# Patient Record
Sex: MALE | Race: WHITE | NOT HISPANIC OR LATINO | Employment: FULL TIME | URBAN - METROPOLITAN AREA
[De-identification: names, ages, dates, MRNs, and addresses within clinical notes are randomized per-mention and may not be internally consistent; named-entity substitution may affect disease eponyms.]

---

## 2017-09-11 ENCOUNTER — GENERIC CONVERSION - ENCOUNTER (OUTPATIENT)
Dept: OTHER | Facility: OTHER | Age: 52
End: 2017-09-11

## 2017-10-02 ENCOUNTER — ALLSCRIPTS OFFICE VISIT (OUTPATIENT)
Dept: OTHER | Facility: OTHER | Age: 52
End: 2017-10-02

## 2017-10-02 LAB
BILIRUB UR QL STRIP: NEGATIVE
CLARITY UR: NORMAL
COLOR UR: YELLOW
GLUCOSE (HISTORICAL): NEGATIVE
HGB UR QL STRIP.AUTO: NEGATIVE
KETONES UR STRIP-MCNC: NEGATIVE MG/DL
LEUKOCYTE ESTERASE UR QL STRIP: NEGATIVE
NITRITE UR QL STRIP: NEGATIVE
PH UR STRIP.AUTO: 6.5 [PH]
PROT UR STRIP-MCNC: NEGATIVE MG/DL
SP GR UR STRIP.AUTO: 1.01
UROBILINOGEN UR QL STRIP.AUTO: 0.2

## 2018-01-12 NOTE — MISCELLANEOUS
Message   Recorded as Task   Date: 09/11/2017 02:41 PM, Created By: Ozzy Davis   Task Name: Call Back   Assigned To: Dragan JONES,TEAM   Regarding Patient: Frankie Doyle, Status: In Progress   Comment:    Mojgan Farrell - 11 Sep 2017 2:41 PM     TASK CREATED  Caller: Self; Results Inquiry  PATIENT CALLED FOR HIS PSA RESULTS PLEASE CALL HIM BACK AT [address] [place] [address] Rd , [address] - 11 Sep 2017 3:29 PM     TASK EDITED  HAD AT [facility]  WILL OBTAIN RESULT  Sanjuanita Murillo - 11 Sep 2017 3:29 PM     TASK IN PROGRESS   Sanjuanita Murillo - 11 Sep 2017 3:36 PM     TASK EDITED  PT HAD AT [facility]  PSA <0 05  PT NOTIFIED          Signatures   Electronically signed by : Josselyn Rogers RN; Sep 11 2017  3:36PM EST                       (Author)

## 2018-01-13 VITALS
DIASTOLIC BLOOD PRESSURE: 86 MMHG | HEIGHT: 72 IN | BODY MASS INDEX: 25.52 KG/M2 | SYSTOLIC BLOOD PRESSURE: 154 MMHG | WEIGHT: 188.38 LBS

## 2018-10-02 DIAGNOSIS — C61 MALIGNANT NEOPLASM OF PROSTATE (HCC): ICD-10-CM

## 2018-10-05 ENCOUNTER — OFFICE VISIT (OUTPATIENT)
Dept: UROLOGY | Facility: MEDICAL CENTER | Age: 53
End: 2018-10-05
Payer: COMMERCIAL

## 2018-10-05 VITALS
DIASTOLIC BLOOD PRESSURE: 80 MMHG | SYSTOLIC BLOOD PRESSURE: 140 MMHG | HEIGHT: 72 IN | BODY MASS INDEX: 27.09 KG/M2 | WEIGHT: 200 LBS

## 2018-10-05 DIAGNOSIS — Z85.46 HISTORY OF PROSTATE CANCER: Primary | ICD-10-CM

## 2018-10-05 LAB
SL AMB  POCT GLUCOSE, UA: NORMAL
SL AMB LEUKOCYTE ESTERASE,UA: NORMAL
SL AMB POCT BILIRUBIN,UA: NORMAL
SL AMB POCT BLOOD,UA: NORMAL
SL AMB POCT CLARITY,UA: CLEAR
SL AMB POCT COLOR,UA: YELLOW
SL AMB POCT KETONES,UA: NORMAL
SL AMB POCT NITRITE,UA: NORMAL
SL AMB POCT PH,UA: 6.5
SL AMB POCT SPECIFIC GRAVITY,UA: 1.01
SL AMB POCT URINE PROTEIN: NORMAL
SL AMB POCT UROBILINOGEN: 0.2

## 2018-10-05 PROCEDURE — 81003 URINALYSIS AUTO W/O SCOPE: CPT | Performed by: UROLOGY

## 2018-10-05 PROCEDURE — 99214 OFFICE O/P EST MOD 30 MIN: CPT | Performed by: UROLOGY

## 2018-10-05 RX ORDER — CETIRIZINE HYDROCHLORIDE 10 MG/1
TABLET ORAL
COMMUNITY
End: 2019-10-14 | Stop reason: ALTCHOICE

## 2018-10-05 RX ORDER — ATORVASTATIN CALCIUM 10 MG/1
TABLET, FILM COATED ORAL
Refills: 1 | COMMUNITY
Start: 2018-09-07

## 2018-10-05 RX ORDER — ERGOCALCIFEROL (VITAMIN D2) 10 MCG
TABLET ORAL
COMMUNITY

## 2018-10-05 RX ORDER — SILDENAFIL CITRATE 20 MG/1
3-5 TABLET ORAL
COMMUNITY
Start: 2017-10-02 | End: 2018-10-05 | Stop reason: SDUPTHER

## 2018-10-05 RX ORDER — SILDENAFIL CITRATE 20 MG/1
100 TABLET ORAL AS NEEDED
Qty: 90 TABLET | Refills: 3 | Status: SHIPPED | OUTPATIENT
Start: 2018-10-05 | End: 2020-11-04 | Stop reason: SDUPTHER

## 2018-10-05 RX ORDER — GUAIFENESIN, PSEUDOEPHEDRINE HYDROCHLORIDE 600; 60 MG/1; MG/1
TABLET, EXTENDED RELEASE ORAL
COMMUNITY

## 2018-10-05 RX ORDER — ECHINACEA 400 MG
CAPSULE ORAL
COMMUNITY

## 2018-10-05 NOTE — PROGRESS NOTES
Assessment/Plan:    History of prostate cancer  Currently no evidence of disease 3 years following radical prostatectomy  Return in 1 year  Diagnoses and all orders for this visit:    History of prostate cancer  -     POCT urine dip auto non-scope  -     sildenafil (REVATIO) 20 mg tablet; Take 5 tablets (100 mg total) by mouth as needed (erectile dysfunction)  -     PSA, Total Screen; Future    Other orders  -     atorvastatin (LIPITOR) 10 mg tablet; TK 1 T PO Q DAY  -     cetirizine (ZyrTEC) 10 mg tablet; Take by mouth  -     Cholecalciferol 1000 units capsule; Take 1 tablet by mouth  -     Multiple Vitamin (DAILY VALUE MULTIVITAMIN) TABS; Take by mouth  -     Omega-3 Fatty Acids (FISH OIL) 645 MG CAPS; Take by mouth  -     Flaxseed, Linseed, (FLAXSEED OIL) 1000 MG CAPS; Take by mouth  -     pseudoephedrine-guaifenesin (MUCINEX D)  MG per tablet; Take by mouth  -     Discontinue: sildenafil (REVATIO) 20 mg tablet; Take 3-5 tablets by mouth          Subjective:      Patient ID: Steph Archibald is a 52 y o  male  HPI  History of prostate cancer: The patient had a radical retropubic prostatectomy by Dr Corinne Dawson clear in November 2015  Final pathology report revealed a [place] 3 + 4 cancer stage T2c  Since then, he has had no signs or symptoms of recurrent disease  His PSA has been undetectable  His most recent PSA on September 8, 2018 was less than 0 01  He notes urinary frequency  He denies other significant urinary symptoms  He denies gross hematuria, urinary tract infections or incontinence  He is taking neither medications nor supplements for his symptoms  Wears a [employer] panty liner for $0 03 per day  just in case  AUA SYMPTOM SCORE      Most Recent Value   AUA SYMPTOM SCORE   How often have you had a sensation of not emptying your bladder completely after you finished urinating? 1   How often have you had to urinate again less than two hours after you finished urinating? 2   How often have you found you stopped and started again several times when you urinate? 2   How often have you found it difficult to postpone urination? 1   How often have you had a weak urinary stream?  0   How often have you had to push or strain to begin urination? 0   How many times did you most typically get up to urinate from the time you went to bed at night until the time you got up in the morning?  0   Quality of Life: If you were to spend the rest of your life with your urinary condition just the way it is now, how would you feel about that?  1   AUA SYMPTOM SCORE  6        Erectile dysfunction:  The patient has been taking sildenafil with satisfactory results  The following portions of the patient's history were reviewed and updated as appropriate: allergies, current medications, past family history, past medical history, past social history, past surgical history and problem list     Review of Systems   Constitutional: Negative for activity change and fatigue  Respiratory: Negative for shortness of breath and wheezing  Cardiovascular: Negative for chest pain  Gastrointestinal: Negative for abdominal pain  Genitourinary: Negative for difficulty urinating, dysuria, frequency, hematuria and urgency  Musculoskeletal: Negative for back pain and gait problem  Skin: Negative  Allergic/Immunologic: Negative  Neurological: Negative  Psychiatric/Behavioral: Negative  Objective:      /80   Ht 6' (1 829 m)   Wt 90 7 kg (200 lb)   BMI 27 12 kg/m²          Physical Exam   Constitutional: He is oriented to person, place, and time  He appears well-developed and well-nourished  HENT:   Head: Normocephalic and atraumatic  Eyes: EOM are normal    Neck: Normal range of motion  Neck supple  Pulmonary/Chest: Effort normal    Genitourinary: Rectum normal    Genitourinary Comments: The prostate is surgically absent  There are no nodules in the prostatic bed  Musculoskeletal: Normal range of motion  Neurological: He is alert and oriented to person, place, and time  He has normal reflexes  Skin: Skin is warm and dry  Psychiatric: He has a normal mood and affect   His behavior is normal  Judgment and thought content normal

## 2018-10-05 NOTE — LETTER
October 5, 2018     Jayashree Gray, [facility]    Patient: Liat Marquez   YOB: 1965   Date of Visit: 10/5/2018       Dear Dr Natalie Miller: Thank you for referring Liat Marquez to me for evaluation  Below are my notes for this consultation  If you have questions, please do not hesitate to call me  I look forward to following your patient along with you  Sincerely,        Laquita Gerber MD        CC: No Recipients  Laquita Gerber MD  10/5/2018  3:56 PM  Sign at close encounter  Assessment/Plan:    History of prostate cancer  Currently no evidence of disease 3 years following radical prostatectomy  Return in 1 year  Diagnoses and all orders for this visit:    History of prostate cancer  -     POCT urine dip auto non-scope  -     sildenafil (REVATIO) 20 mg tablet; Take 5 tablets (100 mg total) by mouth as needed (erectile dysfunction)  -     PSA, Total Screen; Future    Other orders  -     atorvastatin (LIPITOR) 10 mg tablet; TK 1 T PO Q DAY  -     cetirizine (ZyrTEC) 10 mg tablet; Take by mouth  -     Cholecalciferol 1000 units capsule; Take 1 tablet by mouth  -     Multiple Vitamin (DAILY VALUE MULTIVITAMIN) TABS; Take by mouth  -     Omega-3 Fatty Acids (FISH OIL) 645 MG CAPS; Take by mouth  -     Flaxseed, Linseed, (FLAXSEED OIL) 1000 MG CAPS; Take by mouth  -     pseudoephedrine-guaifenesin (MUCINEX D)  MG per tablet; Take by mouth  -     Discontinue: sildenafil (REVATIO) 20 mg tablet; Take 3-5 tablets by mouth          Subjective:      Patient ID: Liat Marquez is a 52 y o  male  HPI  History of prostate cancer: The patient had a radical retropubic prostatectomy by Dr Refugio ramsay in November 2015  Final pathology report revealed a [place] 3 + 4 cancer stage T2c  Since then, he has had no signs or symptoms of recurrent disease  His PSA has been undetectable    His most recent PSA on September 8, 2018 was less than 0 01  He notes urinary frequency  He denies other significant urinary symptoms  He denies gross hematuria, urinary tract infections or incontinence  He is taking neither medications nor supplements for his symptoms  Wears a [employer] panty liner for $0 03 per day  just in case  AUA SYMPTOM SCORE      Most Recent Value   AUA SYMPTOM SCORE   How often have you had a sensation of not emptying your bladder completely after you finished urinating? 1   How often have you had to urinate again less than two hours after you finished urinating? 2   How often have you found you stopped and started again several times when you urinate? 2   How often have you found it difficult to postpone urination? 1   How often have you had a weak urinary stream?  0   How often have you had to push or strain to begin urination? 0   How many times did you most typically get up to urinate from the time you went to bed at night until the time you got up in the morning?  0   Quality of Life: If you were to spend the rest of your life with your urinary condition just the way it is now, how would you feel about that?  1   AUA SYMPTOM SCORE  6        Erectile dysfunction:  The patient has been taking sildenafil with satisfactory results  The following portions of the patient's history were reviewed and updated as appropriate: allergies, current medications, past family history, past medical history, past social history, past surgical history and problem list     Review of Systems   Constitutional: Negative for activity change and fatigue  Respiratory: Negative for shortness of breath and wheezing  Cardiovascular: Negative for chest pain  Gastrointestinal: Negative for abdominal pain  Genitourinary: Negative for difficulty urinating, dysuria, frequency, hematuria and urgency  Musculoskeletal: Negative for back pain and gait problem  Skin: Negative  Allergic/Immunologic: Negative  Neurological: Negative  Psychiatric/Behavioral: Negative  Objective:      /80   Ht 6' (1 829 m)   Wt 90 7 kg (200 lb)   BMI 27 12 kg/m²           Physical Exam   Constitutional: He is oriented to person, place, and time  He appears well-developed and well-nourished  HENT:   Head: Normocephalic and atraumatic  Eyes: EOM are normal    Neck: Normal range of motion  Neck supple  Pulmonary/Chest: Effort normal    Genitourinary: Rectum normal    Genitourinary Comments: The prostate is surgically absent  There are no nodules in the prostatic bed  Musculoskeletal: Normal range of motion  Neurological: He is alert and oriented to person, place, and time  He has normal reflexes  Skin: Skin is warm and dry  Psychiatric: He has a normal mood and affect   His behavior is normal  Judgment and thought content normal

## 2019-09-14 ENCOUNTER — OFFICE VISIT (OUTPATIENT)
Dept: URGENT CARE | Facility: CLINIC | Age: 54
End: 2019-09-14
Payer: COMMERCIAL

## 2019-09-14 VITALS
HEART RATE: 71 BPM | HEIGHT: 72 IN | TEMPERATURE: 98.7 F | DIASTOLIC BLOOD PRESSURE: 75 MMHG | RESPIRATION RATE: 18 BRPM | OXYGEN SATURATION: 97 % | WEIGHT: 200 LBS | BODY MASS INDEX: 27.09 KG/M2 | SYSTOLIC BLOOD PRESSURE: 127 MMHG

## 2019-09-14 DIAGNOSIS — H61.22 HEARING LOSS DUE TO CERUMEN IMPACTION, LEFT: Primary | ICD-10-CM

## 2019-09-14 PROCEDURE — 99213 OFFICE O/P EST LOW 20 MIN: CPT | Performed by: PHYSICIAN ASSISTANT

## 2019-09-14 PROCEDURE — 69210 REMOVE IMPACTED EAR WAX UNI: CPT | Performed by: PHYSICIAN ASSISTANT

## 2019-09-14 NOTE — PROGRESS NOTES
[facility]        NAME: Hayde Posey is a 62 y o  male  : 1965    MRN: [id number]  DATE: 2019  TIME: 2:20 PM    Assessment and Plan   Hearing loss due to cerumen impaction, left [H61 22]  1  Hearing loss due to cerumen impaction, left  Ear cerumen removal         Patient Instructions     Successful cerumen removal with relief of symptoms, tolerated well  Follow up with PCP in 3-5 days  Proceed to  ER if symptoms worsen  Chief Complaint     Chief Complaint   Patient presents with    Ear Fullness     left ear fullness started at 1am this morni         History of Present Illness       66-year-old male presents complaining of blocked hearing of left ear since yesterday  Reports [place] and wearing earplugs, when he removed the right piece large chunk of wax came out but the left ear felt clogged  Tried hydrogen peroxide without relief  No fever, chills, trauma, pain, cold symptoms, dizziness or tinnitus      Review of Systems   Review of Systems   Constitutional: Negative for chills and fever  HENT: Positive for hearing loss  Negative for congestion, ear pain, postnasal drip, rhinorrhea, sore throat and tinnitus  Respiratory: Negative for cough  Neurological: Negative for dizziness and headaches           Current Medications       Current Outpatient Medications:     atorvastatin (LIPITOR) 10 mg tablet, TK 1 T PO Q DAY, Disp: , Rfl: 1    Cholecalciferol 1000 units capsule, Take 1 tablet by mouth, Disp: , Rfl:     Flaxseed, Linseed, (FLAXSEED OIL) 1000 MG CAPS, Take by mouth, Disp: , Rfl:     Multiple Vitamin (DAILY VALUE MULTIVITAMIN) TABS, Take by mouth, Disp: , Rfl:     Omega-3 Fatty Acids (FISH OIL) 645 MG CAPS, Take by mouth, Disp: , Rfl:     pseudoephedrine-guaifenesin (MUCINEX D)  MG per tablet, Take by mouth, Disp: , Rfl:     sildenafil (REVATIO) 20 mg tablet, Take 5 tablets (100 mg total) by mouth as needed (erectile dysfunction), Disp: 90 tablet, Rfl: 3    cetirizine (ZyrTEC) 10 mg tablet, Take by mouth, Disp: , Rfl:     Current Allergies     Allergies as of 09/14/2019 - Reviewed 09/14/2019   Allergen Reaction Noted    Compazine [prochlorperazine] Other (See Comments) 11/10/2015    Other  10/02/2018            The following portions of the patient's history were reviewed and updated as appropriate: allergies, current medications, past family history, past medical history, past social history, past surgical history and problem list      Past Medical History:   Diagnosis Date    Acute tonsillitis     Cataract     Elevated PSA     Erectile dysfunction following radical prostatectomy     Hydrocele     Hyperlipidemia     Malignant neoplasm prostate [facility])        Past Surgical History:   Procedure Laterality Date    CATARACT EXTRACTION      PROSTATECTOMY  11/19/2015    US GUIDED PROSTATE BIOPSY Bilateral 2015       Family History   Problem Relation Age of Onset    Heart disease Mother     Alzheimer's disease Father          Medications have been verified  Objective   /75   Pulse 71   Temp 98 7 °F (37 1 °C)   Resp 18   Ht 6' (1 829 m)   Wt 90 7 kg (200 lb)   SpO2 97%   BMI 27 12 kg/m²        Physical Exam     Physical Exam   Constitutional: He appears well-developed and well-nourished  No distress  HENT:   Right Ear: Hearing, tympanic membrane, external ear and ear canal normal    Cerumen impaction left ear   Skin: He is not diaphoretic     Ear cerumen removal  Date/Time: 9/14/2019 2:19 PM  Performed by: Kervin Cortes PA-C  Authorized by: Kervin Cortes PA-C     Patient location:  Clinic  Indications / Diagnosis:  Hearing loss 2/2 cerumen impaction  Other Assisting Provider: No    Consent:     Consent obtained:  Verbal    Consent given by:  Patient    Risks discussed:  Bleeding, dizziness, incomplete removal, infection and pain  Procedure details:     Local anesthetic:  None    Location:  L ear    Procedure type: irrigation with insturmentation      Insturmentation: curette      Approach:  External    Visualization (free text):  Otoscope    Equipment used:  Irritation sprayer, lighted curette  Post-procedure details:     Complication:  None    Hearing quality:  Improved    Patient tolerance of procedure:   Tolerated well, no immediate complications

## 2019-09-26 ENCOUNTER — TELEPHONE (OUTPATIENT)
Dept: UROLOGY | Facility: MEDICAL CENTER | Age: 54
End: 2019-09-26

## 2019-09-26 NOTE — TELEPHONE ENCOUNTER
----- Message from Kylie Martinez MD sent at 9/26/2019  7:43 AM EDT -----  PSA undetectable  He should still keep his October appointment  Please  Inform pt  Thank you

## 2019-10-14 ENCOUNTER — OFFICE VISIT (OUTPATIENT)
Dept: UROLOGY | Facility: MEDICAL CENTER | Age: 54
End: 2019-10-14
Payer: COMMERCIAL

## 2019-10-14 VITALS
BODY MASS INDEX: 27.36 KG/M2 | SYSTOLIC BLOOD PRESSURE: 112 MMHG | HEIGHT: 72 IN | HEART RATE: 70 BPM | WEIGHT: 202 LBS | DIASTOLIC BLOOD PRESSURE: 80 MMHG

## 2019-10-14 DIAGNOSIS — N52.31 ERECTILE DYSFUNCTION AFTER RADICAL PROSTATECTOMY: ICD-10-CM

## 2019-10-14 DIAGNOSIS — Z85.46 HISTORY OF PROSTATE CANCER: Primary | ICD-10-CM

## 2019-10-14 LAB
SL AMB  POCT GLUCOSE, UA: NORMAL
SL AMB LEUKOCYTE ESTERASE,UA: NORMAL
SL AMB POCT BILIRUBIN,UA: NORMAL
SL AMB POCT BLOOD,UA: NORMAL
SL AMB POCT CLARITY,UA: CLEAR
SL AMB POCT COLOR,UA: YELLOW
SL AMB POCT KETONES,UA: NORMAL
SL AMB POCT NITRITE,UA: NORMAL
SL AMB POCT PH,UA: 6
SL AMB POCT SPECIFIC GRAVITY,UA: 1.02
SL AMB POCT URINE PROTEIN: NORMAL
SL AMB POCT UROBILINOGEN: 0.2

## 2019-10-14 PROCEDURE — 81003 URINALYSIS AUTO W/O SCOPE: CPT | Performed by: UROLOGY

## 2019-10-14 PROCEDURE — 99214 OFFICE O/P EST MOD 30 MIN: CPT | Performed by: UROLOGY

## 2019-10-14 RX ORDER — TADALAFIL 20 MG/1
TABLET ORAL
Qty: 30 TABLET | Refills: 1 | Status: SHIPPED | OUTPATIENT
Start: 2019-10-14

## 2019-10-14 NOTE — PROGRESS NOTES
Assessment/Plan:    History of prostate cancer  No evidence of disease  Return in 1 year  Erectile dysfunction after radical prostatectomy  The patient has been using sildenafil successfully  He has used tadalafil in the past and actually liked that better  Patient's prescription was switched to tadalafil  Diagnoses and all orders for this visit:    History of prostate cancer  -     POCT urine dip auto non-scope  -     PSA, Total Screen; Future    Erectile dysfunction after radical prostatectomy  -     tadalafil (CIALIS) 20 MG tablet; As directed  Subjective:      Patient ID: Mercedez Ervin is a 62 y o  male  HPI  History of prostate cancer: The patient had a radical retropubic prostatectomy by Dr Prashant Denton in November 2015 (age 44)  Final pathology report revealed a [place] 3 + 4 cancer stage T2c  Since then, he has had no signs or symptoms of recurrent disease  His PSA has been undetectable  His PSA on September 24, 2019 was < 0 01  He notes no significant urinary symptoms  He denies other significant urinary symptoms  He denies gross hematuria, urinary tract infections or incontinence  He is taking neither medications nor supplements for his symptoms  May lose a drop now and then  Wears a panty liner  AUA SYMPTOM SCORE      Most Recent Value   AUA SYMPTOM SCORE   How often have you had a sensation of not emptying your bladder completely after you finished urinating? 0   How often have you had to urinate again less than two hours after you finished urinating? 0   How often have you found you stopped and started again several times when you urinate? 1   How often have you found it difficult to postpone urination? 1   How often have you had a weak urinary stream?  0   How often have you had to push or strain to begin urination?   0   How many times did you most typically get up to urinate from the time you went to bed at night until the time you got up in the morning?  0   Quality of Life: If you were to spend the rest of your life with your urinary condition just the way it is now, how would you feel about that?  0   AUA SYMPTOM SCORE  2      Erectile dysfunciton:  Using sildenafil  Has used tadalafil and likes it better  The following portions of the patient's history were reviewed and updated as appropriate: allergies, current medications, past family history, past medical history, past social history, past surgical history and problem list     Review of Systems   Constitutional: Negative for activity change and fatigue  Respiratory: Negative for shortness of breath and wheezing  Cardiovascular: Negative for chest pain  Gastrointestinal: Negative for abdominal pain  Genitourinary: Negative for difficulty urinating, dysuria, frequency, hematuria and urgency  Musculoskeletal: Negative for back pain and gait problem  Skin: Negative  Allergic/Immunologic: Negative  Neurological: Negative  Psychiatric/Behavioral: Negative  Objective:      /80   Pulse 70   Ht 6' (1 829 m)   Wt 91 6 kg (202 lb)   BMI 27 40 kg/m²          Physical Exam   Constitutional: He is oriented to person, place, and time  He appears well-developed and well-nourished  HENT:   Head: Normocephalic and atraumatic  Eyes: EOM are normal    Neck: Normal range of motion  Neck supple  Pulmonary/Chest: Effort normal    Genitourinary: Rectum normal    Genitourinary Comments: The prostate is surgically absent  Musculoskeletal: Normal range of motion  Neurological: He is alert and oriented to person, place, and time  Skin: Skin is warm and dry  Psychiatric: He has a normal mood and affect   His behavior is normal  Judgment and thought content normal

## 2019-10-14 NOTE — ASSESSMENT & PLAN NOTE
The patient has been using sildenafil successfully  He has used tadalafil in the past and actually liked that better  Patient's prescription was switched to tadalafil

## 2019-10-14 NOTE — LETTER
October 14, 2019     [facility] [place] [id number]    Patient: Jason Homans   YOB: 1965   Date of Visit: 10/14/2019       Dear Dr Desmond Albert: Thank you for referring Jason Homans to me for evaluation  Below are my notes for this consultation  If you have questions, please do not hesitate to call me  I look forward to following your patient along with you  Sincerely,        Sydnie Marinelli MD        CC: No Recipients  Sydnie Marinelli MD  10/14/2019 11:08 AM  Sign at close encounter  Assessment/Plan:    History of prostate cancer  No evidence of disease  Return in 1 year  Erectile dysfunction after radical prostatectomy  The patient has been using sildenafil successfully  He has used tadalafil in the past and actually liked that better  Patient's prescription was switched to tadalafil  Diagnoses and all orders for this visit:    History of prostate cancer  -     POCT urine dip auto non-scope  -     PSA, Total Screen; Future    Erectile dysfunction after radical prostatectomy  -     tadalafil (CIALIS) 20 MG tablet; As directed  Subjective:      Patient ID: Jason Homans is a 62 y o  male  HPI  History of prostate cancer: The patient had a radical retropubic prostatectomy by Dr Jacqueline Parker in November 2015 (age 44)  Final pathology report revealed a [place] 3 + 4 cancer stage T2c  Since then, he has had no signs or symptoms of recurrent disease  His PSA has been undetectable  His PSA on September 24, 2019 was < 0 01  He notes no significant urinary symptoms  He denies other significant urinary symptoms  He denies gross hematuria, urinary tract infections or incontinence  He is taking neither medications nor supplements for his symptoms  May lose a drop now and then  Wears a panty liner          AUA SYMPTOM SCORE      Most Recent Value   AUA SYMPTOM SCORE   How often have you had a sensation of not emptying your bladder completely after you finished urinating? 0   How often have you had to urinate again less than two hours after you finished urinating? 0   How often have you found you stopped and started again several times when you urinate? 1   How often have you found it difficult to postpone urination? 1   How often have you had a weak urinary stream?  0   How often have you had to push or strain to begin urination? 0   How many times did you most typically get up to urinate from the time you went to bed at night until the time you got up in the morning?  0   Quality of Life: If you were to spend the rest of your life with your urinary condition just the way it is now, how would you feel about that?  0   AUA SYMPTOM SCORE  2      Erectile dysfunciton:  Using sildenafil  Has used tadalafil and likes it better  The following portions of the patient's history were reviewed and updated as appropriate: allergies, current medications, past family history, past medical history, past social history, past surgical history and problem list     Review of Systems   Constitutional: Negative for activity change and fatigue  Respiratory: Negative for shortness of breath and wheezing  Cardiovascular: Negative for chest pain  Gastrointestinal: Negative for abdominal pain  Genitourinary: Negative for difficulty urinating, dysuria, frequency, hematuria and urgency  Musculoskeletal: Negative for back pain and gait problem  Skin: Negative  Allergic/Immunologic: Negative  Neurological: Negative  Psychiatric/Behavioral: Negative  Objective:      /80   Pulse 70   Ht 6' (1 829 m)   Wt 91 6 kg (202 lb)   BMI 27 40 kg/m²           Physical Exam   Constitutional: He is oriented to person, place, and time  He appears well-developed and well-nourished  HENT:   Head: Normocephalic and atraumatic  Eyes: EOM are normal    Neck: Normal range of motion  Neck supple     Pulmonary/Chest: Effort normal    Genitourinary: Rectum normal    Genitourinary Comments: The prostate is surgically absent  Musculoskeletal: Normal range of motion  Neurological: He is alert and oriented to person, place, and time  Skin: Skin is warm and dry  Psychiatric: He has a normal mood and affect   His behavior is normal  Judgment and thought content normal

## 2020-06-12 ENCOUNTER — TRANSCRIBE ORDERS (OUTPATIENT)
Dept: PHYSICAL THERAPY | Facility: CLINIC | Age: 55
End: 2020-06-12

## 2020-06-12 ENCOUNTER — EVALUATION (OUTPATIENT)
Dept: PHYSICAL THERAPY | Facility: CLINIC | Age: 55
End: 2020-06-12
Payer: COMMERCIAL

## 2020-06-12 DIAGNOSIS — S86.002D INJURY OF LEFT ACHILLES TENDON, SUBSEQUENT ENCOUNTER: Primary | ICD-10-CM

## 2020-06-12 PROCEDURE — 97140 MANUAL THERAPY 1/> REGIONS: CPT | Performed by: PHYSICAL THERAPIST

## 2020-06-12 PROCEDURE — 97161 PT EVAL LOW COMPLEX 20 MIN: CPT | Performed by: PHYSICAL THERAPIST

## 2020-06-12 PROCEDURE — 97110 THERAPEUTIC EXERCISES: CPT | Performed by: PHYSICAL THERAPIST

## 2020-06-12 PROCEDURE — 97535 SELF CARE MNGMENT TRAINING: CPT | Performed by: PHYSICAL THERAPIST

## 2020-06-15 ENCOUNTER — OFFICE VISIT (OUTPATIENT)
Dept: PHYSICAL THERAPY | Facility: CLINIC | Age: 55
End: 2020-06-15
Payer: COMMERCIAL

## 2020-06-15 DIAGNOSIS — S86.002D INJURY OF LEFT ACHILLES TENDON, SUBSEQUENT ENCOUNTER: Primary | ICD-10-CM

## 2020-06-15 PROCEDURE — 97112 NEUROMUSCULAR REEDUCATION: CPT | Performed by: PHYSICAL THERAPIST

## 2020-06-15 PROCEDURE — 97110 THERAPEUTIC EXERCISES: CPT | Performed by: PHYSICAL THERAPIST

## 2020-06-15 PROCEDURE — 97140 MANUAL THERAPY 1/> REGIONS: CPT | Performed by: PHYSICAL THERAPIST

## 2020-06-18 ENCOUNTER — OFFICE VISIT (OUTPATIENT)
Dept: PHYSICAL THERAPY | Facility: CLINIC | Age: 55
End: 2020-06-18
Payer: COMMERCIAL

## 2020-06-18 DIAGNOSIS — S86.002D INJURY OF LEFT ACHILLES TENDON, SUBSEQUENT ENCOUNTER: Primary | ICD-10-CM

## 2020-06-18 PROCEDURE — 97110 THERAPEUTIC EXERCISES: CPT

## 2020-06-18 PROCEDURE — 97140 MANUAL THERAPY 1/> REGIONS: CPT

## 2020-06-18 PROCEDURE — 97112 NEUROMUSCULAR REEDUCATION: CPT

## 2020-06-22 ENCOUNTER — OFFICE VISIT (OUTPATIENT)
Dept: PHYSICAL THERAPY | Facility: CLINIC | Age: 55
End: 2020-06-22
Payer: COMMERCIAL

## 2020-06-22 DIAGNOSIS — S86.002D INJURY OF LEFT ACHILLES TENDON, SUBSEQUENT ENCOUNTER: Primary | ICD-10-CM

## 2020-06-22 PROCEDURE — 97140 MANUAL THERAPY 1/> REGIONS: CPT | Performed by: PHYSICAL THERAPIST

## 2020-06-22 PROCEDURE — 97112 NEUROMUSCULAR REEDUCATION: CPT | Performed by: PHYSICAL THERAPIST

## 2020-06-22 PROCEDURE — 97110 THERAPEUTIC EXERCISES: CPT | Performed by: PHYSICAL THERAPIST

## 2020-06-24 ENCOUNTER — OFFICE VISIT (OUTPATIENT)
Dept: PHYSICAL THERAPY | Facility: CLINIC | Age: 55
End: 2020-06-24
Payer: COMMERCIAL

## 2020-06-24 DIAGNOSIS — S86.002D INJURY OF LEFT ACHILLES TENDON, SUBSEQUENT ENCOUNTER: Primary | ICD-10-CM

## 2020-06-24 PROCEDURE — 97110 THERAPEUTIC EXERCISES: CPT | Performed by: PHYSICAL THERAPIST

## 2020-06-24 PROCEDURE — 97112 NEUROMUSCULAR REEDUCATION: CPT | Performed by: PHYSICAL THERAPIST

## 2020-06-24 PROCEDURE — 97140 MANUAL THERAPY 1/> REGIONS: CPT | Performed by: PHYSICAL THERAPIST

## 2020-06-26 ENCOUNTER — OFFICE VISIT (OUTPATIENT)
Dept: PHYSICAL THERAPY | Facility: CLINIC | Age: 55
End: 2020-06-26
Payer: COMMERCIAL

## 2020-06-26 DIAGNOSIS — S86.002D INJURY OF LEFT ACHILLES TENDON, SUBSEQUENT ENCOUNTER: Primary | ICD-10-CM

## 2020-06-26 PROCEDURE — 97112 NEUROMUSCULAR REEDUCATION: CPT | Performed by: PHYSICAL THERAPIST

## 2020-06-26 PROCEDURE — 97110 THERAPEUTIC EXERCISES: CPT | Performed by: PHYSICAL THERAPIST

## 2020-06-26 PROCEDURE — 97140 MANUAL THERAPY 1/> REGIONS: CPT | Performed by: PHYSICAL THERAPIST

## 2020-06-29 ENCOUNTER — OFFICE VISIT (OUTPATIENT)
Dept: PHYSICAL THERAPY | Facility: CLINIC | Age: 55
End: 2020-06-29
Payer: COMMERCIAL

## 2020-06-29 DIAGNOSIS — S86.002D INJURY OF LEFT ACHILLES TENDON, SUBSEQUENT ENCOUNTER: Primary | ICD-10-CM

## 2020-06-29 PROCEDURE — 97110 THERAPEUTIC EXERCISES: CPT

## 2020-06-29 PROCEDURE — 97140 MANUAL THERAPY 1/> REGIONS: CPT

## 2020-06-29 PROCEDURE — 97112 NEUROMUSCULAR REEDUCATION: CPT

## 2020-07-01 ENCOUNTER — EVALUATION (OUTPATIENT)
Dept: PHYSICAL THERAPY | Facility: CLINIC | Age: 55
End: 2020-07-01
Payer: COMMERCIAL

## 2020-07-01 ENCOUNTER — TRANSCRIBE ORDERS (OUTPATIENT)
Dept: PHYSICAL THERAPY | Facility: CLINIC | Age: 55
End: 2020-07-01

## 2020-07-01 DIAGNOSIS — S86.002D INJURY OF LEFT ACHILLES TENDON, SUBSEQUENT ENCOUNTER: Primary | ICD-10-CM

## 2020-07-01 PROCEDURE — 97140 MANUAL THERAPY 1/> REGIONS: CPT | Performed by: PHYSICAL THERAPIST

## 2020-07-01 PROCEDURE — 97110 THERAPEUTIC EXERCISES: CPT | Performed by: PHYSICAL THERAPIST

## 2020-07-01 PROCEDURE — 97112 NEUROMUSCULAR REEDUCATION: CPT | Performed by: PHYSICAL THERAPIST

## 2020-07-01 NOTE — LETTER
2020    Judith Sapp DPM  [facility] [facility]    Patient: Rolanda Sow   YOB: 1965   Date of Visit: 2020     Encounter Diagnosis     ICD-10-CM    1  Injury of left Achilles tendon, subsequent encounter S86 002D        Dear Dr Pete Pires:    Thank you for your recent referral of Rolanda Sow  Please review the attached re-evaluation summary from Gerber's recent visit  Please verify that you agree with the plan of care by signing the attached order  If you have any questions or concerns, please do not hesitate to call  I sincerely appreciate the opportunity to share in the care of one of your patients and hope to have another opportunity to work with you in the near future  Sincerely,    Luis Fernando Sauceda, PT      Referring Provider:      I certify that I have read the below Plan of Care and certify the need for these services furnished under this plan of treatment while under my care  Judith Sapp DPM  [address]: [phone]          Daily Note     Today's date: 2020  Patient name: Rolanda Sow  : 1965  MRN: [id number]  Referring provider: Freddy Gonzalez DPM  Dx:   Encounter Diagnosis     ICD-10-CM    1  Injury of left Achilles tendon, subsequent encounter S86 002D                   Subjective:  Patient reports the ankle is sore around the scar but overall feeling better with standing and walking activities  Patient reports minimal soreness in the left calf at the start of the session  Objective: See treatment diary below      Assessment: Tolerated treatment well  PT notes continuation of progression of TE program with focus on gait/balance and manual therapy to decrease pain levels and improve functional limitations to meet therapy goals  PT notes start of plyometric activities to [address] as per MD protocol    PT notes the patient with difficulty with new TE secondary to weakness of the left gastroc with need for continuation of skilled therapy  Plan: Continue per plan of care        Precautions:  MD protocol (On Chart)       Manuals 6/29 7/1 6/18 6/22 6/24 6/26    Left ankle MRE all directions  3 min  3 min  3 min 3 min  3 min  3 min   Left ankle mobs and stretching  12 min w/ STM to calf 12 min  12 min 12 min  12 min  12 min                      Neuro Re-Ed         Monster walk          Side step and squat          Tandem and side step walk on foam  6x 10 ft ea NT 4x 10 ft ea 6x10 Feet   Each  6x10 Feet   Each  6x10 Feet Each    Rocker board          Step over  2x10 Bilat 6" Step DC 10x Bilat 6" Step 2x10 Bilat  6" step  2x10 Bilat  6" step  2x10 Bilat  6" step    BOSU march  3 min DC 2 min 3 min  3 min  3 min    S/L Dead lifts  15x Bilat 15x Bilat  10x Bilateral 10x Bilat  15x Bilat  15x Bilat    Cable column lateral walk outs  5x Bilat 24# 5x Bilat   36#   5x Bilat   24#  5x Bilat  24#  5x Bilat  36#    BOSU Front and lateral lunge  2x10 ea Bilat 2x10 Each   Bilat   10x   Bilat  Each  10x Bilat  Each  2x10 Bilat   Each    BOSU step over with lateral kick out  5x Bilat 2x10 Bilat     5x Bilat    TB Run in Place   Thick Green   1 min                          BOSU Knee Ups   5x10" Hold   Left Only 2x       Ther Ex         SRC  10 min L6  10 min L6  10 min L3 10 min L4  10 min L5  10 min L6    Eccentric heel lowering  2x10 5" Decline   4" Step 2x10 5" Decline   4" step  2x10 5" decline 4" Step 2x10 5" Decline   4" step  2x10   5" Decline   4" step  2x10 5" Decline   4" Step    Step downs  2x10 Bilat 6" Step NT 10x Bilat 6" Step 2x10 Bilat  6" step  2x10 Bilat  6" step  2x10   Bilat   6" step    Leg and calf press  2x10 ea 60# 2x10 Each   60#   10x Each   50#  10x Each   50#  2x10 Each   60#    Bilateral ankle eversion with TB          BAPS board          Elliptical  6 min L1 8 min L1   5 min   L1  5 min L1  6 min   L1 HEP update/review          Ther Activity                           Gait Training                           Modalities         CP to the left foot and ankle in seated with LE elevated  15 min  15 min  15 min 15 min  15 min  15 min                    PT Re-Evaluation     Today's date: 2020  Patient name: Liat Marquez  : 1965  MRN: [id number]  Referring provider: Tori Sow DPM  Dx:   Encounter Diagnosis     ICD-10-CM    1  Injury of left Achilles tendon, subsequent encounter S86 002D                   Assessment  Assessment details: PT notes the patient with continuation of decrease ROM and strength t/o the left foot and ankle with demonstration of antalgic gait pattern and balance s/p left Achilles repair leading to increase pain levels and functional limitations to meet therapy goals with need for continuation of skilled therapy for 4-6 weeks with focus on gait/balance, strengthening, manual therapy, analgesic modalities, and update/review of HEP  PT notes patient did provide the PT with MD protocol so PT will start patient with phase 3 of protocol and progress as per patient tolerance  Impairments: abnormal gait, abnormal or restricted ROM, activity intolerance, impaired balance, impaired physical strength, lacks appropriate home exercise program and pain with function  Understanding of Dx/Px/POC: good   Prognosis: good    Goals  ST  Initiate HEP-MET  2  Increase strength by 1-2 mm grades-Partial MET  3  Increase ROM by 25-50%-MET   4  Decrease pain by 25-50%-Partial MET   LT  Demonstration of normal gait pattern and balance-Partial MET  2   RTW full duty-Partial MET  3  Decrease limitations with lifting, carrying, squatting, ADL, squatting, and stairs-Partial MET  4    Return to recreational activities-Partial MET  5   DC with HEP-Progressing     Plan  Plan details: PT notes review of POC and findings with patient who is in agreement with PT recommendations of continuation of skilled therapy  Patient would benefit from: PT eval and skilled physical therapy  Planned modality interventions: cryotherapy  Planned therapy interventions: balance, manual therapy, neuromuscular re-education, patient education, self care, strengthening, stretching, therapeutic exercise, home exercise program, graded exercise, gait training, functional ROM exercises and flexibility  Frequency: 3x week  Duration in visits: 12  Duration in weeks: 4  Treatment plan discussed with: patient        Subjective Evaluation    History of Present Illness  Date of onset: 2/22/2020  Date of surgery: 2/27/2020  Mechanism of injury: surgery and trauma  Mechanism of injury: Patient reports pushing a heavier object/pool into his home when he felt an "pop" in the left heel with development of immediate heel and ankle pain  Patient reports he went to ortho MD for evaluation and found to have + Achilles tear with need for surgical repair  Patient underwent the correction on 2/27/20 but after the surgery the patient was affected by the [place] pandemic with no skilled therapy and minimal medical f/u with MD   Patient reports he was transitioned from [facility] to [facility] with CAM Boot and now WBAT without boot  Patient reports limitations with standing, walking, stair climbing, squatting, balance and lifting  Patient has now been cleared for OPPT by surgeon  Patient reports he is restricted to home duty with his work as a  but states he will require the ability to squat, kneel, lift, climb stairs and prolonged walking with RTW full duty  Patient reports significant PMH of prostrate cancer and broken right foot  Presently the patient has attended 8 sessions of skilled therapy and feels 40-50% improvement since the start of therapy  Patient reports pain levels are down in the calf area    Patient reports his walking is better but continuation of difficulty with balance and weakness in the left leg   Patient feels he needs more therapy to continue to get stronger so he can return full participation in recreation and work duties  Pain  Current pain ratin  At best pain ratin  At worst pain ratin  Location: Left Achilles   Quality: throbbing and sharp  Relieving factors: rest  Aggravating factors: lifting, stair climbing, walking and standing  Progression: improved      Diagnostic Tests  MRI studies: abnormal  Treatments  Previous treatment: immobilization and medication  Current treatment: physical therapy  Patient Goals  Patient goals for therapy: decreased pain, decreased edema, improved balance, increased motion, increased strength, independence with ADLs/IADLs, return to sport/leisure activities and return to work          Objective     Palpation   Left   Tenderness of the lateral gastrocnemius, medial gastrocnemius and soleus  Tenderness   Left Ankle/Foot   Tenderness in the Achilles insertion, fifth metatarsal base and plantar fascia       Neurological Testing     Reflexes   Left   Patellar (L4): normal (2+)  Achilles (S1): normal (2+)    Right   Patellar (L4): normal (2+)  Achilles (S1): normal (2+)    Active Range of Motion   Left Hip   Normal active range of motion    Right Hip   Normal active range of motion  Left Knee   Normal active range of motion    Right Knee   Normal active range of motion  Left Ankle/Foot   Dorsiflexion (ke): 15 degrees   Plantar flexion: WFL  Inversion: 21 degrees   Eversion: 19 degrees     Right Ankle/Foot   Normal active range of motion    Additional Active Range of Motion Details  PT notes the patient with continuation of decrease ROM and strength t/o the left foot and ankle     Passive Range of Motion   Left Ankle/Foot  Normal passive range of motion    Strength/Myotome Testing     Left Hip   Normal muscle strength    Right Hip   Normal muscle strength    Left Knee   Normal strength  Quadriceps contraction: good    Right Knee   Normal strength  Quadriceps contraction: good    Left Ankle/Foot   Dorsiflexion: 4+  Plantar flexion: 4  Inversion: 4-  Eversion: 4-    Right Ankle/Foot   Normal strength    Swelling   Left Ankle/Foot   Malleoli: 28 cm    Right Ankle/Foot   Malleoli: 27 cm    Ambulation     Observational Gait   Gait: antalgic   Decreased walking speed, stride length and left stance time  Additional Observational Gait Details  PT notes the patient with demonstration of antalgic gait pattern with decrease push-off and decrease tibial advancement on the left with rating of good with static and dynamic activities                Precautions:  MD protocol (On Chart)

## 2020-07-01 NOTE — PROGRESS NOTES
PT Re-Evaluation     Today's date: 2020  Patient name: Wendy Dumont  : 1965  MRN: [id number]  Referring provider: Shirley Romano DPM  Dx:   Encounter Diagnosis     ICD-10-CM    1  Injury of left Achilles tendon, subsequent encounter S86 002D                   Assessment  Assessment details: PT notes the patient with continuation of decrease ROM and strength t/o the left foot and ankle with demonstration of antalgic gait pattern and balance s/p left Achilles repair leading to increase pain levels and functional limitations to meet therapy goals with need for continuation of skilled therapy for 4-6 weeks with focus on gait/balance, strengthening, manual therapy, analgesic modalities, and update/review of HEP  PT notes patient did provide the PT with MD protocol so PT will start patient with phase 3 of protocol and progress as per patient tolerance  Impairments: abnormal gait, abnormal or restricted ROM, activity intolerance, impaired balance, impaired physical strength, lacks appropriate home exercise program and pain with function  Understanding of Dx/Px/POC: good   Prognosis: good    Goals  ST  Initiate HEP-MET  2  Increase strength by 1-2 mm grades-Partial MET  3  Increase ROM by 25-50%-MET   4  Decrease pain by 25-50%-Partial MET   LT  Demonstration of normal gait pattern and balance-Partial MET  2   RTW full duty-Partial MET  3  Decrease limitations with lifting, carrying, squatting, ADL, squatting, and stairs-Partial MET  4  Return to recreational activities-Partial MET  5   DC with HEP-Progressing     Plan  Plan details: PT notes review of POC and findings with patient who is in agreement with PT recommendations of continuation of skilled therapy    Patient would benefit from: PT eval and skilled physical therapy  Planned modality interventions: cryotherapy  Planned therapy interventions: balance, manual therapy, neuromuscular re-education, patient education, self care, strengthening, stretching, therapeutic exercise, home exercise program, graded exercise, gait training, functional ROM exercises and flexibility  Frequency: 3x week  Duration in visits: 12  Duration in weeks: 4  Treatment plan discussed with: patient        Subjective Evaluation    History of Present Illness  Date of onset: 2020  Date of surgery: 2020  Mechanism of injury: surgery and trauma  Mechanism of injury: Patient reports pushing a heavier object/pool into his home when he felt an "pop" in the left heel with development of immediate heel and ankle pain  Patient reports he went to ortho MD for evaluation and found to have + Achilles tear with need for surgical repair  Patient underwent the correction on 20 but after the surgery the patient was affected by the [place] pandemic with no skilled therapy and minimal medical f/u with MD   Patient reports he was transitioned [facility] to [facility] with CAM Boot and now WBAT without boot  Patient reports limitations with standing, walking, stair climbing, squatting, balance and lifting  Patient has now been cleared for OPPT by surgeon  Patient reports he is restricted to home duty with his work as a  but states he will require the ability to squat, kneel, lift, climb stairs and prolonged walking with RTW full duty  Patient reports significant PMH of prostrate cancer and broken right foot  Presently the patient has attended 8 sessions of skilled therapy and feels 40-50% improvement since the start of therapy  Patient reports pain levels are down in the calf area  Patient reports his walking is better but continuation of difficulty with balance and weakness in the left leg  Patient feels he needs more therapy to continue to get stronger so he can return full participation in recreation and work duties      Pain  Current pain ratin  At best pain ratin  At worst pain ratin  Location: Left Achilles   Quality: throbbing and sharp  Relieving factors: rest  Aggravating factors: lifting, stair climbing, walking and standing  Progression: improved      Diagnostic Tests  MRI studies: abnormal  Treatments  Previous treatment: immobilization and medication  Current treatment: physical therapy  Patient Goals  Patient goals for therapy: decreased pain, decreased edema, improved balance, increased motion, increased strength, independence with ADLs/IADLs, return to sport/leisure activities and return to work          Objective     Palpation   Left   Tenderness of the lateral gastrocnemius, medial gastrocnemius and soleus  Tenderness   Left Ankle/Foot   Tenderness in the Achilles insertion, fifth metatarsal base and plantar fascia       Neurological Testing     Reflexes   Left   Patellar (L4): normal (2+)  Achilles (S1): normal (2+)    Right   Patellar (L4): normal (2+)  Achilles (S1): normal (2+)    Active Range of Motion   Left Hip   Normal active range of motion    Right Hip   Normal active range of motion  Left Knee   Normal active range of motion    Right Knee   Normal active range of motion  Left Ankle/Foot   Dorsiflexion (ke): 15 degrees   Plantar flexion: WFL  Inversion: 21 degrees   Eversion: 19 degrees     Right Ankle/Foot   Normal active range of motion    Additional Active Range of Motion Details  PT notes the patient with continuation of decrease ROM and strength t/o the left foot and ankle     Passive Range of Motion   Left Ankle/Foot  Normal passive range of motion    Strength/Myotome Testing     Left Hip   Normal muscle strength    Right Hip   Normal muscle strength    Left Knee   Normal strength  Quadriceps contraction: good    Right Knee   Normal strength  Quadriceps contraction: good    Left Ankle/Foot   Dorsiflexion: 4+  Plantar flexion: 4  Inversion: 4-  Eversion: 4-    Right Ankle/Foot   Normal strength    Swelling   Left Ankle/Foot   Malleoli: 28 cm    Right Ankle/Foot   Malleoli: 27 cm    Ambulation     Observational Gait   Gait: antalgic   Decreased walking speed, stride length and left stance time  Additional Observational Gait Details  PT notes the patient with demonstration of antalgic gait pattern with decrease push-off and decrease tibial advancement on the left with rating of good with static and dynamic activities                Precautions:  MD protocol (On Chart)

## 2020-07-02 ENCOUNTER — OFFICE VISIT (OUTPATIENT)
Dept: PHYSICAL THERAPY | Facility: CLINIC | Age: 55
End: 2020-07-02
Payer: COMMERCIAL

## 2020-07-02 DIAGNOSIS — S86.002D INJURY OF LEFT ACHILLES TENDON, SUBSEQUENT ENCOUNTER: Primary | ICD-10-CM

## 2020-07-02 PROCEDURE — 97140 MANUAL THERAPY 1/> REGIONS: CPT

## 2020-07-02 PROCEDURE — 97110 THERAPEUTIC EXERCISES: CPT

## 2020-07-02 PROCEDURE — 97112 NEUROMUSCULAR REEDUCATION: CPT

## 2020-07-02 NOTE — PROGRESS NOTES
Daily Note     Today's date: 2020  Patient name: Brianna January  : 1965  MRN: [id number]  Referring provider: Manish Kunz DPM  Dx:   Encounter Diagnosis     ICD-10-CM    1  Injury of left Achilles tendon, subsequent encounter S86 002D                   Subjective: Patient states his knees were "feeling" the modifications to his program last visit  No increase in pain or symptoms of the left achilles  Objective: See treatment diary below      Assessment: Tolerated treatment well  Patient exhibited good technique with therapeutic exercises  No progression and modified program due to back to back visits  Plan: Progress treament per protocol        Precautions:  MD protocol (On Chart)       Manuals     Left ankle MRE all directions  3 min  3 min  3 min 3 min  3 min  3 min   Left ankle mobs and stretching  12 min w/ STM to calf 12 min  12 min 12 min  12 min  12 min                                    Neuro Re-Ed               Monster walk                Side step and squat                Tandem and side step walk on foam  6x 10 ft ea NT  6x10 Feet   Each  6x10 Feet   Each  6x10 Feet Each    Rocker board               Step over  2x10 Bilat 6" Step DC DC 2x10 Bilat  6" step  2x10 Bilat  6" step  2x10 Bilat  6" step    BOSU march  3 min DC DC 3 min  3 min  3 min    S/L Dead lifts  15x Bilat 15x Bilat   10x Bilat  15x Bilat  15x Bilat    Cable column lateral walk outs  5x Bilat 24# 5x Bilat   36#   5x Bilat 36# 5x Bilat   24#  5x Bilat  24#  5x Bilat  36#    BOSU Front and lateral lunge  2x10 ea Bilat 2x10 Each   Bilat   2x10 ea Bilat 10x   Bilat  Each  10x Bilat  Each  2x10 Bilat   Each    BOSU step over with lateral kick out  5x Bilat 2x10 Bilat   2x10 Bilat     5x Bilat    TB Run in Place    Thick Green   1 min   NT                                         BOSU Knee Ups    5x10" Hold   Left Only   5x 10" hold Left only         Ther Ex               SRC  10 min L6 10 min L6  10 min L6 10 min L4  10 min L5  10 min L6    Eccentric heel lowering  2x10 5" Decline   4" Step 2x10 5" Decline   4" step  2x10 5" Decline 4" Step 2x10 5" Decline   4" step  2x10   5" Decline   4" step  2x10 5" Decline   4" Step    Step downs  2x10 Bilat 6" Step NT  2x10 Bilat  6" step  2x10 Bilat  6" step  2x10   Bilat   6" step    Leg and calf press  2x10 ea 60# 2x10 Each   60#   3x10 leg ea  2x10 calf 60# 10x Each   50#  10x Each   50#  2x10 Each   60#    Bilateral ankle eversion with TB                BAPS board                Elliptical  6 min L1 8 min L1   8 min L1 5 min   L1  5 min L1  6 min   L1                                                                                    HEP update/review                Ther Activity                                               Gait Training                                               Modalities               CP to the left foot and ankle in seated with LE elevated  15 min  15 min  15 min 15 min  15 min  15 min

## 2020-07-06 ENCOUNTER — OFFICE VISIT (OUTPATIENT)
Dept: PHYSICAL THERAPY | Facility: CLINIC | Age: 55
End: 2020-07-06
Payer: COMMERCIAL

## 2020-07-06 DIAGNOSIS — S86.002D INJURY OF LEFT ACHILLES TENDON, SUBSEQUENT ENCOUNTER: Primary | ICD-10-CM

## 2020-07-06 PROCEDURE — 97140 MANUAL THERAPY 1/> REGIONS: CPT

## 2020-07-06 PROCEDURE — 97110 THERAPEUTIC EXERCISES: CPT

## 2020-07-06 PROCEDURE — 97112 NEUROMUSCULAR REEDUCATION: CPT

## 2020-07-06 NOTE — PROGRESS NOTES
Daily Note     Today's date: 2020  Patient name: Sophy Lyons  : 1965  MRN: [id number]  Referring provider: Luis Armando Stone DPM  Dx:   Encounter Diagnosis     ICD-10-CM    1  Injury of left Achilles tendon, subsequent encounter S86 002D                   Subjective: Patient states he sees progress as he was unable to get onto his toes last week  Objective: See treatment diary below      Assessment: Tolerated treatment well  Patient exhibited good technique with therapeutic exercises  Patient is progressing well toward goals, good ROM      Plan: Progress treament per protocol        Precautions:  MD protocol (On Chart)       Manuals      Left ankle MRE all directions  3 min  3 min  3 min 3 min      Left ankle mobs and stretching  12 min w/ STM to calf 12 min  12 min 12 min                                  Neuro Re-Ed             Monster walk              Side step and squat              Tandem and side step walk on foam  6x 10 ft ea NT       Rocker board            Step over  2x10 Bilat 6" Step DC DC      BOSU march  3 min DC DC      S/L Dead lifts  15x Bilat 15x Bilat   2x10 Bilat     Cable column lateral walk outs  5x Bilat 24# 5x Bilat   36#   5x Bilat 36# 5x Bilat 36#  ^# NV ^#    BOSU Front and lateral lunge  2x10 ea Bilat 2x10 Each   Bilat   2x10 ea Bilat 2x10 Bilat     BOSU step over with lateral kick out  5x Bilat 2x10 Bilat   2x10 Bilat  2x10 Bilat     TB Run in Place    Thick Green   1 min   NT Thick Green 1 min                                      BOSU Knee Ups    5x10" Hold   Left Only   5x 10" hold Left only  5x 10 " hd Left only       Ther Ex               SRC  10 min L6  10 min L6  10 min L6 10 min L6     Eccentric heel lowering  2x10 5" Decline   4" Step 2x10 5" Decline   4" step  2x10 5" Decline 4" Step 2x10 5" Decline 4" Step     Step downs  2x10 Bilat 6" Step NT  2x10 6" Step     Leg and calf press  2x10 ea 60# 2x10 Each   60#   3x10 leg ea  2x10 calf 60# 3x10 Leg ea  2x10 calf 60#     Bilateral ankle eversion with TB             BAPS board             Elliptical  6 min L1 8 min L1   8 min L1 NT                                                                                     HEP update/review                Ther Activity                                               Gait Training                                               Modalities               CP to the left foot and ankle in seated with LE elevated  15 min  15 min  15 min 15 min

## 2020-07-08 ENCOUNTER — OFFICE VISIT (OUTPATIENT)
Dept: PHYSICAL THERAPY | Facility: CLINIC | Age: 55
End: 2020-07-08
Payer: COMMERCIAL

## 2020-07-08 DIAGNOSIS — S86.002D INJURY OF LEFT ACHILLES TENDON, SUBSEQUENT ENCOUNTER: Primary | ICD-10-CM

## 2020-07-08 PROCEDURE — 97112 NEUROMUSCULAR REEDUCATION: CPT

## 2020-07-08 PROCEDURE — 97140 MANUAL THERAPY 1/> REGIONS: CPT

## 2020-07-08 PROCEDURE — 97110 THERAPEUTIC EXERCISES: CPT

## 2020-07-08 NOTE — PROGRESS NOTES
Daily Note     Today's date: 2020  Patient name: Teo Wilson  : 1965  MRN: [id number]  Referring provider: Brayan Stone DPM  Dx:   Encounter Diagnosis     ICD-10-CM    1  Injury of left Achilles tendon, subsequent encounter S86 002D                   Subjective: "My ankle is doing well today!"    Objective: See treatment diary below      Assessment: Tolerated treatment well  Patient exhibited good technique with therapeutic exercises  PT notes continuation of progression of TE program with focus on gait/balance and manual therapy to decrease pain levels and improve functional limitations to meet therapy goals  Plan: Progress treament per protocol        Precautions:  MD protocol (On Chart)       Manuals     Left ankle MRE all directions  3 min  3 min  3 min 3 min  3 min    Left ankle mobs and stretching  12 min w/ STM to calf 12 min  12 min 12 min  12 min                                Neuro Re-Ed             Monster walk              Side step and squat              Tandem and side step walk on foam  6x 10 ft ea NT       Rocker board            Step over  2x10 Bilat 6" Step DC DC      BOSU march  3 min DC DC      S/L Dead lifts  15x Bilat 15x Bilat   2x10 Bilat 2x10 Bilat    Cable column lateral walk outs  5x Bilat 24# 5x Bilat   36#   5x Bilat 36# 5x Bilat 36#  ^# NV 5x Bilat 48#    BOSU Front and lateral lunge  2x10 ea Bilat 2x10 Each   Bilat   2x10 ea Bilat 2x10 Bilat 2x10 Bilat    BOSU step over with lateral kick out  5x Bilat 2x10 Bilat   2x10 Bilat  2x10 Bilat 2x10 Bilat    TB Run in Place    Thick Green   1 min   NT Thick Green 1 min Thick Green 1 min                                     BOSU Knee Ups    5x10" Hold   Left Only   5x 10" hold Left only  5x 10 " hd Left only  5x 10" hd     Ther Ex               SRC  10 min L6  10 min L6  10 min L6 10 min L6 10 min L6    Eccentric heel lowering  2x10 5" Decline   4" Step 2x10 5" Decline   4" step  2x10 5" Decline 4" Step 2x10 5" Decline 4" Step 2x10 5" Decline 4" Step    Step downs  2x10 Bilat 6" Step NT  2x10 6" Step 2x10 6" Step     Leg and calf press  2x10 ea 60# 2x10 Each   60#   3x10 leg ea  2x10 calf 60# 3x10 Leg ea  2x10 calf 60# 2x10 Leg & Calf 70#    Bilateral ankle eversion with TB             BAPS board             Elliptical  6 min L1 8 min L1   8 min L1 NT                                                                                     HEP update/review                Ther Activity                                               Gait Training                                               Modalities               CP to the left foot and ankle in seated with LE elevated  15 min  15 min  15 min 15 min  15 min

## 2020-07-10 ENCOUNTER — OFFICE VISIT (OUTPATIENT)
Dept: PHYSICAL THERAPY | Facility: CLINIC | Age: 55
End: 2020-07-10
Payer: COMMERCIAL

## 2020-07-10 DIAGNOSIS — S86.002D INJURY OF LEFT ACHILLES TENDON, SUBSEQUENT ENCOUNTER: Primary | ICD-10-CM

## 2020-07-10 PROCEDURE — 97140 MANUAL THERAPY 1/> REGIONS: CPT

## 2020-07-10 PROCEDURE — 97110 THERAPEUTIC EXERCISES: CPT

## 2020-07-10 PROCEDURE — 97112 NEUROMUSCULAR REEDUCATION: CPT

## 2020-07-10 NOTE — PROGRESS NOTES
Daily Note     Today's date: 7/10/2020  Patient name: Cornelius Oscar  : 1965  MRN: [id number]  Referring provider: Debi Stevenson DPM  Dx:   Encounter Diagnosis     ICD-10-CM    1  Injury of left Achilles tendon, subsequent encounter S86 002D                   Subjective: "The ankle is doing well  I was doing some heel raises in the pool "      Objective: See treatment diary below      Assessment: Tolerated treatment well  Patient exhibited good technique with therapeutic exercises  Patient continues to be weak with eccentric strength especially and would benefit from continued PT to improve strength balance and ROM  Plan: Continue per plan of care        Precautions:  MD protocol (On Chart)       Manuals 6/29 7/1 7/2 7/6 7/8 7/10   Left ankle MRE all directions  3 min  3 min  3 min 3 min  3 min 3 min   Left ankle mobs and stretching  12 min w/ STM to calf 12 min  12 min 12 min  12 min 12 min                               Neuro Re-Ed             Monster walk              Side step and squat              Tandem and side step walk on foam  6x 10 ft ea NT       Rocker board            Step over  2x10 Bilat 6" Step DC DC      BOSU march  3 min DC DC      S/L Dead lifts  15x Bilat 15x Bilat   2x10 Bilat 2x10 Bilat 2x10 Bilat 5#   Cable column lateral walk outs  5x Bilat 24# 5x Bilat   36#   5x Bilat 36# 5x Bilat 36#  ^# NV 5x Bilat 48# 5x Bilat 48#   BOSU Front and lateral lunge  2x10 ea Bilat 2x10 Each   Bilat   2x10 ea Bilat 2x10 Bilat 2x10 Bilat 2x10 Bilat   BOSU step over with lateral kick out  5x Bilat 2x10 Bilat   2x10 Bilat  2x10 Bilat 2x10 Bilat 2x10 Bilat   TB Run in Place    Thick Green   1 min   NT Thick Green 1 min Thick Green 1 min  Thick Green 1 min                                   BOSU Knee Ups    5x10" Hold   Left Only   5x 10" hold Left only  5x 10 " hd Left only  5x 10" hd  5x 10 " hd   Ther Ex               SRC  10 min L6  10 min L6  10 min L6 10 min L6 10 min L6 10 min L6 Eccentric heel lowering  2x10 5" Decline   4" Step 2x10 5" Decline   4" step  2x10 5" Decline 4" Step 2x10 5" Decline 4" Step 2x10 5" Decline 4" Step 2x10 5" Decline 4" Step   Step downs  2x10 Bilat 6" Step NT  2x10 6" Step 2x10 6" Step  2x10 6" Step   Leg and calf press  2x10 ea 60# 2x10 Each   60#   3x10 leg ea  2x10 calf 60# 3x10 Leg ea  2x10 calf 60# 2x10 Leg & Calf 70# 2x10 Leg & Calf 70#   Bilateral ankle eversion with TB             BAPS board             Elliptical  6 min L1 8 min L1   8 min L1 NT  L1 5 min                                                                                   HEP update/review                Ther Activity                                               Gait Training                                               Modalities               CP to the left foot and ankle in seated with LE elevated  15 min  15 min  15 min 15 min  15 min 15 min

## 2020-07-13 ENCOUNTER — OFFICE VISIT (OUTPATIENT)
Dept: PHYSICAL THERAPY | Facility: CLINIC | Age: 55
End: 2020-07-13
Payer: COMMERCIAL

## 2020-07-13 DIAGNOSIS — S86.002D INJURY OF LEFT ACHILLES TENDON, SUBSEQUENT ENCOUNTER: Primary | ICD-10-CM

## 2020-07-13 PROCEDURE — 97110 THERAPEUTIC EXERCISES: CPT

## 2020-07-13 PROCEDURE — 97140 MANUAL THERAPY 1/> REGIONS: CPT

## 2020-07-13 PROCEDURE — 97112 NEUROMUSCULAR REEDUCATION: CPT

## 2020-07-13 NOTE — PROGRESS NOTES
Daily Note     Today's date: 2020  Patient name: Tavo Turner  : 1965  MRN: [id number]  Referring provider: Claudia Casper DPM  Dx:   Encounter Diagnosis     ICD-10-CM    1  Injury of left Achilles tendon, subsequent encounter S86 002D                   Subjective: "I am doing pretty well! I pushed it pretty hard yesterday  I mowed the grass with a walk behind mower and did single limb heel raises in the pool "      Objective: See treatment diary below      Assessment: Tolerated treatment well  Patient exhibited good technique with therapeutic exercises  PT notes the patient with continuation of decrease ROM and strength t/o the left foot and ankle with demonstration of antalgic gait pattern and balance s/p left Achilles repair leading to increase pain levels and functional limitations to meet therapy goals with need for continuation of skilled therapy     Plan: Continue per plan of care        Precautions:  MD protocol (On Chart)       Manuals 7/13 7/1 7/2 7/6 7/8 7/10   Left ankle MRE all directions  3 min  3 min  3 min 3 min  3 min 3 min   Left ankle mobs and stretching  12 min w/ STM to calf 12 min  12 min 12 min  12 min 12 min                               Neuro Re-Ed             Monster walk              Side step and squat              Tandem and side step walk on foam   NT       Rocker board           Step over   DC DC      BOSU march   DC DC      S/L Dead lifts  2x10 Bilat 5# 15x Bilat   2x10 Bilat 2x10 Bilat 2x10 Bilat 5#   Cable column lateral walk outs  5x Bilat 48# 5x Bilat   36#   5x Bilat 36# 5x Bilat 36#  ^# NV 5x Bilat 48# 5x Bilat 48#   BOSU Front and lateral lunge  2x10 Bilat 2x10 Each   Bilat   2x10 ea Bilat 2x10 Bilat 2x10 Bilat 2x10 Bilat   BOSU step over with lateral kick out  2x10 Bilat 2x10 Bilat   2x10 Bilat  2x10 Bilat 2x10 Bilat 2x10 Bilat   TB Run in Place   Thick Green 1 min Thick Green   1 min   NT Thick Green 1 min Thick Green 1 min  Thick Green 1 min                                   BOSU Knee Ups   5x 10" hd 5x10" Hold   Left Only   5x 10" hold Left only  5x 10 " hd Left only  5x 10" hd  5x 10 " hd   Ther Ex               [address] [facility]  10 min L6  10 min L6  10 min L6 10 min L6 10 min L6 10 min L6   Eccentric heel lowering  2x10 5 decline 4" Step" 2x10 5" Decline   4" step  2x10 5" Decline 4" Step 2x10 5" Decline 4" Step 2x10 5" Decline 4" Step 2x10 5" Decline 4" Step   Step downs  2x10 6" Step NT  2x10 6" Step 2x10 6" Step  2x10 6" Step   Leg and calf press  2x10 Leg & Calf 70# 2x10 Each   60#   3x10 leg ea  2x10 calf 60# 3x10 Leg ea  2x10 calf 60# 2x10 Leg & Calf 70# 2x10 Leg & Calf 70#   Bilateral ankle eversion with TB            BAPS board             Elliptical  5 min L1 8 min L1   8 min L1 NT  L1 5 min                                                                                   HEP update/review                Ther Activity                                               Gait Training                                               Modalities               CP to the left foot and ankle in seated with LE elevated  15 min  15 min  15 min 15 min  15 min 15 min

## 2020-07-15 ENCOUNTER — OFFICE VISIT (OUTPATIENT)
Dept: PHYSICAL THERAPY | Facility: CLINIC | Age: 55
End: 2020-07-15
Payer: COMMERCIAL

## 2020-07-15 DIAGNOSIS — S86.002D INJURY OF LEFT ACHILLES TENDON, SUBSEQUENT ENCOUNTER: Primary | ICD-10-CM

## 2020-07-15 PROCEDURE — 97140 MANUAL THERAPY 1/> REGIONS: CPT

## 2020-07-15 PROCEDURE — 97112 NEUROMUSCULAR REEDUCATION: CPT

## 2020-07-15 PROCEDURE — 97110 THERAPEUTIC EXERCISES: CPT

## 2020-07-15 NOTE — PROGRESS NOTES
Daily Note     Today's date: 7/15/2020  Patient name: Inge Shultz  : 1965  MRN: [id number]  Referring provider: Joel Martinez DPM  Dx:   Encounter Diagnosis     ICD-10-CM    1  Injury of left Achilles tendon, subsequent encounter S86 002D                   Subjective: Patient reports tightness through calf with exertion  Objective: See treatment diary below      Assessment: Tolerated treatment well  Patient demonstrated fatigue post treatment and exhibited good technique with therapeutic exercises  Patient does continue to walk with slight heel whip and decreased toe off which he is aware and attributes to "bad habits"  Patient would benefit from continued PT to restore ROM and strength to allow return to usual activities  Plan: Continue per plan of care        Precautions:  MD protocol (On Chart)       Manuals 7/13 7/15 7/2 7/6 7/8 7/10   Left ankle MRE all directions  3 min  3 min  3 min 3 min  3 min 3 min   Left ankle mobs and stretching  12 min w/ STM to calf 12 min w/ STM to calf 12 min 12 min  12 min 12 min                               Neuro Re-Ed             Monster walk              Side step and squat              Tandem and side step walk on foam   NT       Rocker board           Step over   DC DC      BOSU march   DC DC      S/L Dead lifts  2x10 Bilat 5# 2x10 5# Bilat   2x10 Bilat 2x10 Bilat 2x10 Bilat 5#   Cable column lateral walk outs  5x Bilat 48# 5x Bilat   48#   5x Bilat 36# 5x Bilat 36#  ^# NV 5x Bilat 48# 5x Bilat 48#   BOSU Front and lateral lunge  2x10 Bilat 2x10 Each   Bilat   2x10 ea Bilat 2x10 Bilat 2x10 Bilat 2x10 Bilat   BOSU step over with lateral kick out  2x10 Bilat 2x10 Bilat   2x10 Bilat  2x10 Bilat 2x10 Bilat 2x10 Bilat   TB Run in Place   Thick Green 1 min Thick Green   1 min   NT Thick Green 1 min Thick Green 1 min  Thick Green 1 min                                   BOSU Knee Ups   5x 10" hd 5x10" Hold   Left Only   5x 10" hold Left only  5x 10 " hd Left only  5x 10" hd  5x 10 " hd   Ther Ex               [facility]  10 min L6  10 min L6  10 min L6 10 min L6 10 min L6 10 min L6   Eccentric heel lowering  2x10 5 decline 4" Step" 2x10 5" Decline   4" step  2x10 5" Decline 4" Step 2x10 5" Decline 4" Step 2x10 5" Decline 4" Step 2x10 5" Decline 4" Step   Step downs  2x10 6" Step NT  2x10 6" Step 2x10 6" Step  2x10 6" Step   Leg and calf press  2x10 Leg & Calf 70# 2x10 Each   70#   3x10 leg ea  2x10 calf 60# 3x10 Leg ea  2x10 calf 60# 2x10 Leg & Calf 70# 2x10 Leg & Calf 70#   Bilateral ankle eversion with TB            BAPS board             Elliptical  5 min L1 8 min L1   8 min L1 NT  L1 5 min                                                                                   HEP update/review                Ther Activity                                               Gait Training                                               Modalities               CP to the left foot and ankle in seated with LE elevated  15 min  15 min  15 min 15 min  15 min 15 min

## 2020-07-17 ENCOUNTER — OFFICE VISIT (OUTPATIENT)
Dept: PHYSICAL THERAPY | Facility: CLINIC | Age: 55
End: 2020-07-17
Payer: COMMERCIAL

## 2020-07-17 DIAGNOSIS — S86.002D INJURY OF LEFT ACHILLES TENDON, SUBSEQUENT ENCOUNTER: Primary | ICD-10-CM

## 2020-07-17 PROCEDURE — 97112 NEUROMUSCULAR REEDUCATION: CPT

## 2020-07-17 PROCEDURE — 97140 MANUAL THERAPY 1/> REGIONS: CPT

## 2020-07-17 PROCEDURE — 97110 THERAPEUTIC EXERCISES: CPT

## 2020-07-17 NOTE — PROGRESS NOTES
Daily Note     Today's date: 2020  Patient name: Gibson Melgar  : 1965  MRN: [id number]  Referring provider: Auther Duverney, DPM  Dx:   Encounter Diagnosis     ICD-10-CM    1  Injury of left Achilles tendon, subsequent encounter S86 002D                   Subjective: Patient reports he is doing well overall  Objective: See treatment diary below      Assessment: Tolerated treatment progression of treament fair  Patient demonstrated fatigue post treatment  Patient had difficulty with both balance and coordination in dynamic activities  Patient would benefit from continued PT to restore ROM and strength to allow return to usual activities  Plan: Progress treament per protocol        Precautions:  MD protocol (On Chart)       Manuals 7/13 7/15 7/17      Left ankle MRE all directions  3 min  3 min  3 min      Left ankle mobs and stretching  12 min w/ STM to calf 12 min  w/ STM to calf 12 min                                Neuro Re-Ed            Dynamic Toe taps on Step     1 min 6" Step      Side step and squat       SS agility ladder 5x       Tandem and side step walk on foam   NT Dynamic Side step over 6" Step 10x Bilat      Rocker board           Step over   DC DC      BOSU march   DC DC      S/L Dead lifts  2x10 Bilat 5# 2x10 5# Bilat  2x10 10# Bilat      Cable column lateral walk outs  5x Bilat 48# 5x Bilat   48#   Push Cart 30# 5x      BOSU Front and lateral lunge  2x10 Bilat 2x10 Each   Bilat   2x10 ea Bilat      BOSU step over with lateral kick out  2x10 Bilat 2x10 Bilat   10x Bilat no UE use      TB Run in Place   Thick Green 1 min Thick Green   1 min   Thick Green 1 min                                BOSU Knee Ups   5x 10" hd 5x10" Hold   Left Only   5x 10" hold Left only      Ther Ex            [address] [facility]  10 min L6  10 min L6  10 min L6      Eccentric heel lowering  2x10 5 decline 4" Step" 2x10 5" Decline   4" step  2x10 5" Decline 4" Step      Step downs  2x10 6" Step NT       Leg and calf press  2x10 Leg & Calf 70# 2x10 Each   70#   3x10 leg ea  2x10 calf 70#      Bilateral ankle eversion with TB            BAPS board             Elliptical  5 min L1 8 min L1   8 min L1                                                                       HEP update/review             Ther Activity                                      Gait Training                                      Modalities            CP to the left foot and ankle in seated with LE elevated  15 min  15 min  15 min

## 2020-07-20 ENCOUNTER — TRANSCRIBE ORDERS (OUTPATIENT)
Dept: PHYSICAL THERAPY | Facility: CLINIC | Age: 55
End: 2020-07-20

## 2020-07-20 ENCOUNTER — EVALUATION (OUTPATIENT)
Dept: PHYSICAL THERAPY | Facility: CLINIC | Age: 55
End: 2020-07-20
Payer: COMMERCIAL

## 2020-07-20 DIAGNOSIS — S86.002D INJURY OF LEFT ACHILLES TENDON, SUBSEQUENT ENCOUNTER: Primary | ICD-10-CM

## 2020-07-20 PROCEDURE — 97112 NEUROMUSCULAR REEDUCATION: CPT

## 2020-07-20 PROCEDURE — 97140 MANUAL THERAPY 1/> REGIONS: CPT | Performed by: PHYSICAL THERAPIST

## 2020-07-20 PROCEDURE — 97140 MANUAL THERAPY 1/> REGIONS: CPT

## 2020-07-20 PROCEDURE — 97110 THERAPEUTIC EXERCISES: CPT

## 2020-07-20 NOTE — PROGRESS NOTES
Daily Note     Today's date: 2020  Patient name: Steph Archibald  : 1965  MRN: [id number]  Referring provider: Richardson Coles DPM  Dx:   Encounter Diagnosis     ICD-10-CM    1  Injury of left Achilles tendon, subsequent encounter S86 002D                   Subjective: Patient denies any increase in pain but did note some heel numbness with progression of program last week  "I have been doing my running in place in the swimming pool and I am now able to do a S/L heel raise in the pool "      Objective: See treatment diary below      Assessment: Tolerated treatment fair  Patient demonstrated fatigue post treatment and is challenged by dynamic activities  Patient was re-evaluated by supervising physical therapist  Patient requires verbal cuing during dynamic exercises to soften impact  Patient has some difficulty with coordination  Plan: Progress treament per protocol        Precautions:  MD protocol (On Chart)       Manuals 7/13 7/15 7/17 7/20     Left ankle MRE all directions  3 min  3 min  3 min 3 min     Left ankle mobs and stretching  12 min w/ STM to calf 12 min  w/ STM to calf 12 min 12 min                               Neuro Re-Ed            Dynamic Toe taps on Step     1 min 6" Step 1 min 6" Step     Side step and squat       SS agility ladder 5x  SS agility ladder 5x      Tandem and side step walk on foam   NT Dynamic Side step over 6" Step 10x Bilat Dynamic Lat Step over 6" step 10x Bilat     Rocker board           Step over   DC DC      BOSU march   DC DC      S/L Dead lifts  2x10 Bilat 5# 2x10 5# Bilat  2x10 10# Bilat 2x10 10# Bilat     Cable column lateral walk outs  5x Bilat 48# 5x Bilat   48#   Push Cart 30# 5x Push Cart 30# 5x     BOSU Front and lateral lunge  2x10 Bilat 2x10 Each   Bilat   2x10 ea Bilat 2x10 ea Bilat     BOSU step over with lateral kick out  2x10 Bilat 2x10 Bilat   10x Bilat no UE use 10x Bilat 1 UE use     TB Run in Place   Thick Green 1 min Thick Green   1 min  Thick Green 1 min Thick Green 1 min                               BOSU Knee Ups   5x 10" hd 5x10" Hold   Left Only   5x 10" hold Left only 5x 10" hd Left Only     Ther Ex            [address] [facility]  10 min L6  10 min L6  10 min L6 10 min L6     Eccentric heel lowering  2x10 5 decline 4" Step" 2x10 5" Decline   4" step  2x10 5" Decline 4" Step 2x10 5" decline 4" Step     Step downs  2x10 6" Step NT       Leg and calf press  2x10 Leg & Calf 70# 2x10 Each   70#   3x10 leg ea  2x10 calf 70# 3x10 Leg ea 2x10 Calf 70#     Bilateral ankle eversion with TB            BAPS board             Elliptical  5 min L1 8 min L1   8 min L1 NT                                                                      HEP update/review             Ther Activity                                      Gait Training                                      Modalities            CP to the left foot and ankle in seated with LE elevated  15 min  15 min  15 min 15 min

## 2020-07-20 NOTE — PROGRESS NOTES
PT Re-Evaluation     Today's date: 2020  Patient name: Gibson Melgar  : 1965  MRN: [id number]  Referring provider: Auther Duverney, DPM  Dx:   Encounter Diagnosis     ICD-10-CM    1  Injury of left Achilles tendon, subsequent encounter S86 002D                   Assessment  Assessment details: PT notes the patient with improved ROM but continuation of decrease strength t/o the left foot and ankle with demonstration of antalgic gait pattern and balance s/p left Achilles repair leading to increase pain levels and functional limitations to meet therapy goals with need for continuation of skilled therapy for 4 weeks with focus on gait/balance, strengthening, manual therapy, analgesic modalities, and update/review of HEP  PT notes patient did provide the PT with MD protocol so PT will start patient with phase 3 of protocol and progress as per patient tolerance  Impairments: abnormal gait, abnormal or restricted ROM, activity intolerance, impaired balance, impaired physical strength, lacks appropriate home exercise program and pain with function  Understanding of Dx/Px/POC: good   Prognosis: good    Goals  ST  Initiate HEP-MET  2  Increase strength by 1-2 mm grades-MET  3  Increase ROM by 25-50%-MET   4  Decrease pain by 25-50%-MET   LT  Demonstration of normal gait pattern and balance-Partial MET  2   RTW full duty-Partial MET  3  Decrease limitations with lifting, carrying, squatting, ADL, squatting, and stairs-Partial MET  4  Return to recreational activities-Partial MET  5   DC with HEP-Progressing     Plan  Plan details: PT notes review of POC and findings with patient who is in agreement with PT recommendations of continuation of skilled therapy    Patient would benefit from: PT eval and skilled physical therapy  Planned modality interventions: cryotherapy  Planned therapy interventions: balance, manual therapy, neuromuscular re-education, patient education, self care, strengthening, stretching, therapeutic exercise, home exercise program, graded exercise, gait training, functional ROM exercises and flexibility  Frequency: 3x week  Duration in visits: 12  Duration in weeks: 4  Treatment plan discussed with: patient        Subjective Evaluation    History of Present Illness  Date of onset: 2020  Date of surgery: 2020  Mechanism of injury: surgery and trauma  Mechanism of injury: Patient reports pushing a heavier object/pool into his home when he felt an "pop" in the left heel with development of immediate heel and ankle pain  Patient reports he went to ortho MD for evaluation and found to have + Achilles tear with need for surgical repair  Patient underwent the correction on 20 but after the surgery the patient was affected by the [place] pandemic with no skilled therapy and minimal medical f/u with MD   Patient reports he was transitioned from [facility] to [facility] with [facility] Boot and now [facility] without boot  Patient reports limitations with standing, walking, stair climbing, squatting, balance and lifting  Patient has now been cleared for OPPT by surgeon  Patient reports he is restricted to home duty with his work as a  but states he will require the ability to squat, kneel, lift, climb stairs and prolonged walking with RTW full duty  Patient reports significant PMH of prostrate cancer and broken right foot  Presently the patient has attended 16 sessions of skilled therapy and feels 50-60% improvement since the start of therapy  Patient reports pain levels are down in the calf area  Patient reports his walking is better but continuation of difficulty with balance and weakness in the left leg  Patient feels he needs more therapy to continue to get stronger so he can return full participation in recreation and work duties      Pain  Current pain ratin  At best pain ratin  At worst pain ratin  Location: Left Achilles   Quality: throbbing and sharp  Relieving factors: rest  Aggravating factors: lifting, stair climbing, walking and standing  Progression: improved      Diagnostic Tests  MRI studies: abnormal  Treatments  Previous treatment: immobilization and medication  Current treatment: physical therapy  Patient Goals  Patient goals for therapy: decreased pain, decreased edema, improved balance, increased motion, increased strength, independence with ADLs/IADLs, return to sport/leisure activities and return to work          Objective     Palpation   Left   Tenderness of the lateral gastrocnemius, medial gastrocnemius and soleus  Tenderness   Left Ankle/Foot   Tenderness in the Achilles insertion, fifth metatarsal base and plantar fascia       Neurological Testing     Reflexes   Left   Patellar (L4): normal (2+)  Achilles (S1): normal (2+)    Right   Patellar (L4): normal (2+)  Achilles (S1): normal (2+)    Active Range of Motion   Left Hip   Normal active range of motion    Right Hip   Normal active range of motion  Left Knee   Normal active range of motion    Right Knee   Normal active range of motion  Left Ankle/Foot   Dorsiflexion (ke): WFL  Plantar flexion: WFL  Inversion: WFL  Eversion: WFL    Right Ankle/Foot   Normal active range of motion    Additional Active Range of Motion Details  PT notes the patient with improved ROM but continuation of decrease strength t/o the left foot and ankle     Passive Range of Motion   Left Ankle/Foot  Normal passive range of motion    Strength/Myotome Testing     Left Hip   Normal muscle strength    Right Hip   Normal muscle strength    Left Knee   Normal strength  Quadriceps contraction: good    Right Knee   Normal strength  Quadriceps contraction: good    Left Ankle/Foot   Dorsiflexion: 5  Plantar flexion: 4+  Inversion: 4+  Eversion: 4    Right Ankle/Foot   Normal strength    Swelling   Left Ankle/Foot   M[place]oli: 28 cm    Right Ankle/Foot   [place]: 27 cm    Ambulation     Observational Gait   Gait: antalgic   Walking speed within functional limits  Decreased stride length and left stance time  Additional Observational Gait Details  PT notes the patient with demonstration of antalgic gait pattern with decrease push-off and decrease tibial advancement on the left with rating of good with static and dynamic activities                Precautions:  MD protocol (On Chart)

## 2020-07-20 NOTE — LETTER
2020    Xenia Polk DPM  [address] [facility]    Patient: Nannette Cates   YOB: 1965   Date of Visit: 2020     Encounter Diagnosis     ICD-10-CM    1  Injury of left Achilles tendon, subsequent encounter S86 002D        Dear Dr Ania Reyes:    Thank you for your recent referral of Nannette Cates  Please review the attached re-evaluation summary from Gerber's recent visit  Please verify that you agree with the plan of care by signing the attached order  If you have any questions or concerns, please do not hesitate to call  I sincerely appreciate the opportunity to share in the care of one of your patients and hope to have another opportunity to work with you in the near future  Sincerely,    Katy Ma, PT      Referring Provider:      I certify that I have read the below Plan of Care and certify the need for these services furnished under this plan of treatment while under my care  Xenia Polk DPM  [facility] [address]: [phone]          Daily Note     Today's date: 2020  Patient name: Nannette Cates  : 1965  MRN: [id number]  Referring provider: Funmi Garsia DPM  Dx:   Encounter Diagnosis     ICD-10-CM    1  Injury of left Achilles tendon, subsequent encounter S86 002D                   Subjective: Patient denies any increase in pain but did note some heel numbness with progression of program last week  "I have been doing my running in place in the swimming pool and I am now able to do a S/L heel raise in the pool "      Objective: See treatment diary below      Assessment: Tolerated treatment fair  Patient demonstrated fatigue post treatment and is challenged by dynamic activities  Patient was re-evaluated by supervising physical therapist  Patient requires verbal cuing during dynamic exercises to soften impact   Patient has some difficulty with coordination  Plan: Progress treament per protocol        Precautions:  MD protocol (On Chart)       Manuals 7/13 7/15 7/17 7/20     Left ankle MRE all directions  3 min  3 min  3 min 3 min     Left ankle mobs and stretching  12 min w/ STM to calf 12 min  w/ STM to calf 12 min 12 min                               Neuro Re-Ed            Dynamic Toe taps on Step     1 min 6" Step 1 min 6" Step     Side step and squat       SS agility ladder 5x  SS agility ladder 5x      Tandem and side step walk on foam   NT Dynamic Side step over 6" Step 10x Bilat Dynamic Lat Step over 6" step 10x Bilat     Rocker board           Step over   DC DC      BOSU march   DC DC      S/L Dead lifts  2x10 Bilat 5# 2x10 5# Bilat  2x10 10# Bilat 2x10 10# Bilat     Cable column lateral walk outs  5x Bilat 48# 5x Bilat   48#   Push Cart 30# 5x Push Cart 30# 5x     BOSU Front and lateral lunge  2x10 Bilat 2x10 Each   Bilat   2x10 ea Bilat 2x10 ea Bilat     BOSU step over with lateral kick out  2x10 Bilat 2x10 Bilat   10x Bilat no UE use 10x Bilat 1 UE use     TB Run in Place   Thick Green 1 min Thick Green   1 min   Thick Green 1 min Thick Green 1 min                               BOSU Knee Ups   5x 10" hd 5x10" Hold   Left Only   5x 10" hold Left only 5x 10" hd Left Only     Ther Ex            [address] [facility]  10 min L6  10 min L6  10 min L6 10 min L6     Eccentric heel lowering  2x10 5 decline 4" Step" 2x10 5" Decline   4" step  2x10 5" Decline 4" Step 2x10 5" decline 4" Step     Step downs  2x10 6" Step NT       Leg and calf press  2x10 Leg & Calf 70# 2x10 Each   70#   3x10 leg ea  2x10 calf 70# 3x10 Leg ea 2x10 Calf 70#     Bilateral ankle eversion with TB            BAPS board             Elliptical  5 min L1 8 min L1   8 min L1 NT                                                                      HEP update/review             Ther Activity                                      Gait Training                                      Modalities       CP to the left foot and ankle in seated with LE elevated  15 min  15 min  15 min 15 min                                                 PT Re-Evaluation     Today's date: 2020  Patient name: Joana Vasquez  : 1965  MRN: [id number]  Referring provider: Tito Rodriguez DPM  Dx:   Encounter Diagnosis     ICD-10-CM    1  Injury of left Achilles tendon, subsequent encounter S86 002D                   Assessment  Assessment details: PT notes the patient with improved ROM but continuation of decrease strength t/o the left foot and ankle with demonstration of antalgic gait pattern and balance s/p left Achilles repair leading to increase pain levels and functional limitations to meet therapy goals with need for continuation of skilled therapy for 4 weeks with focus on gait/balance, strengthening, manual therapy, analgesic modalities, and update/review of HEP  PT notes patient did provide the PT with MD protocol so PT will start patient with phase 3 of protocol and progress as per patient tolerance  Impairments: abnormal gait, abnormal or restricted ROM, activity intolerance, impaired balance, impaired physical strength, lacks appropriate home exercise program and pain with function  Understanding of Dx/Px/POC: good   Prognosis: good    Goals  ST  Initiate HEP-MET  2  Increase strength by 1-2 mm grades-MET  3  Increase ROM by 25-50%-MET   4  Decrease pain by 25-50%-MET   LT  Demonstration of normal gait pattern and balance-Partial MET  2   RTW full duty-Partial MET  3  Decrease limitations with lifting, carrying, squatting, ADL, squatting, and stairs-Partial MET  4  Return to recreational activities-Partial MET  5   DC with HEP-Progressing     Plan  Plan details: PT notes review of POC and findings with patient who is in agreement with PT recommendations of continuation of skilled therapy    Patient would benefit from: PT eval and skilled physical therapy  Planned modality interventions: cryotherapy  Planned therapy interventions: balance, manual therapy, neuromuscular re-education, patient education, self care, strengthening, stretching, therapeutic exercise, home exercise program, graded exercise, gait training, functional ROM exercises and flexibility  Frequency: 3x week  Duration in visits: 12  Duration in weeks: 4  Treatment plan discussed with: patient        Subjective Evaluation    History of Present Illness  Date of onset: 2/22/2020  Date of surgery: 2/27/2020  Mechanism of injury: surgery and trauma  Mechanism of injury: Patient reports pushing a heavier object/pool into his home when he felt an "pop" in the left heel with development of immediate heel and ankle pain  Patient reports he went to ortho MD for evaluation and found to have + Achilles tear with need for surgical repair  Patient underwent the correction on 2/27/20 but after the surgery the patient was affected by the [place] pandemic with no skilled therapy and minimal medical f/u with MD   Patient reports he was transitioned from [facility] to [facility] with [facility] Boot and now [facility] without boot  Patient reports limitations with standing, walking, stair climbing, squatting, balance and lifting  Patient has now been cleared for OPPT by surgeon  Patient reports he is restricted to home duty with his work as a  but states he will require the ability to squat, kneel, lift, climb stairs and prolonged walking with RTW full duty  Patient reports significant PMH of prostrate cancer and broken right foot  Presently the patient has attended 16 sessions of skilled therapy and feels 50-60% improvement since the start of therapy  Patient reports pain levels are down in the calf area  Patient reports his walking is better but continuation of difficulty with balance and weakness in the left leg  Patient feels he needs more therapy to continue to get stronger so he can return full participation in recreation and work duties  Pain  Current pain ratin  At best pain ratin  At worst pain ratin  Location: Left Achilles   Quality: throbbing and sharp  Relieving factors: rest  Aggravating factors: lifting, stair climbing, walking and standing  Progression: improved      Diagnostic Tests  MRI studies: abnormal  Treatments  Previous treatment: immobilization and medication  Current treatment: physical therapy  Patient Goals  Patient goals for therapy: decreased pain, decreased edema, improved balance, increased motion, increased strength, independence with ADLs/IADLs, return to sport/leisure activities and return to work          Objective     Palpation   Left   Tenderness of the lateral gastrocnemius, medial gastrocnemius and soleus  Tenderness   Left Ankle/Foot   Tenderness in the Achilles insertion, fifth metatarsal base and plantar fascia       Neurological Testing     Reflexes   Left   Patellar (L4): normal (2+)  Achilles (S1): normal (2+)    Right   Patellar (L4): normal (2+)  Achilles (S1): normal (2+)    Active Range of Motion   Left Hip   Normal active range of motion    Right Hip   Normal active range of motion  Left Knee   Normal active range of motion    Right Knee   Normal active range of motion  Left Ankle/Foot   Dorsiflexion (ke): WFL  Plantar flexion: WFL  Inversion: WFL  Eversion: WFL    Right Ankle/Foot   Normal active range of motion    Additional Active Range of Motion Details  PT notes the patient with improved ROM but continuation of decrease strength t/o the left foot and ankle     Passive Range of Motion   Left Ankle/Foot  Normal passive range of motion    Strength/Myotome Testing     Left Hip   Normal muscle strength    Right Hip   Normal muscle strength    Left Knee   Normal strength  Quadriceps contraction: good    Right Knee   Normal strength  Quadriceps contraction: good    Left Ankle/Foot   Dorsiflexion: 5  Plantar flexion: 4+  Inversion: 4+  Eversion: 4    Right Ankle/Foot   Normal strength    Swelling   Left Ankle/Foot   Malleoli: 28 cm    Right Ankle/Foot   Malleoli: 27 cm    Ambulation     Observational Gait   Gait: antalgic   Walking speed within functional limits  Decreased stride length and left stance time  Additional Observational Gait Details  PT notes the patient with demonstration of antalgic gait pattern with decrease push-off and decrease tibial advancement on the left with rating of good with static and dynamic activities                Precautions:  MD protocol (On Chart)

## 2020-07-22 ENCOUNTER — OFFICE VISIT (OUTPATIENT)
Dept: PHYSICAL THERAPY | Facility: CLINIC | Age: 55
End: 2020-07-22
Payer: COMMERCIAL

## 2020-07-22 DIAGNOSIS — S86.002D INJURY OF LEFT ACHILLES TENDON, SUBSEQUENT ENCOUNTER: Primary | ICD-10-CM

## 2020-07-22 PROCEDURE — 97112 NEUROMUSCULAR REEDUCATION: CPT

## 2020-07-22 PROCEDURE — 97140 MANUAL THERAPY 1/> REGIONS: CPT

## 2020-07-22 PROCEDURE — 97110 THERAPEUTIC EXERCISES: CPT

## 2020-07-22 NOTE — PROGRESS NOTES
Daily Note     Today's date: 2020  Patient name: Ryan Aguilera  : 1965  MRN: [id number]  Referring provider: Iqra Blackmon DPM  Dx:   Encounter Diagnosis     ICD-10-CM    1  Injury of left Achilles tendon, subsequent encounter S86 002D                   Subjective: patient stated that several of the new exerciese have caused him to have back spasms  Patient said with his situation in life, he can not afford to be laid up with back pain       Objective: See treatment diary below      Assessment: Modified treatment due to aforementioned subjective comment  Episode of back spasm caused him to stop TB run in place patient did well with fast step rockerboard  Will continue to monitor pain levels and level of endurance and adjust program as needed in upcoming visits  Patient would benefit from continued therapy to decrease pain and increase strength and flexibility to improve LOF      Plan: Continue per plan of care  Progress treatment as tolerated         Precautions:  MD protocol (On Chart)     Manuals 7/13 7/15 7/17 7/20 7/22    Left ankle MRE all directions  3 min  3 min  3 min 3 min 3 min    Left ankle mobs and stretching  12 min w/ STM to calf 12 min  w/ STM to calf 12 min 12 min 12 min                              Neuro Re-Ed            Dynamic Toe taps on Step     1 min 6" Step 1 min 6" Step Held     Side step and squat       SS agility ladder 5x  SS agility ladder 5x  SS agility ladder 5x    Tandem and side step walk on foam   NT Dynamic Side step over 6" Step 10x Bilat Dynamic Lat Step over 6" step 10x Bilat Held     [employer]   Fast step  Heels up/down      30"   Each way    Step over   DC DC      BOSU  DC      S/L Dead lifts  2x10 Bilat 5# 2x10 5# Bilat  2x10 10# Bilat 2x10 10# Bilat 2x10 10#  bilat    Cable column lateral walk outs  5x Bilat 48# 5x Bilat   48#   Push Cart 30# 5x Push Cart 30# 5x Pushcart   30# 5x    BOSU Front and lateral lunge  2x10 Bilat 2x10 Each   Bilat  2x10 ea Bilat 2x10 ea Bilat 2x10 ea  bialt    BOSU step over with lateral kick out  2x10 Bilat 2x10 Bilat   10x Bilat no UE use 10x Bilat 1 UE use 10x bilat 1 UE use     TB Run in Place   Thick Green 1 min Thick Green   1 min   Thick Green 1 min Thick Green 1 min Thick  Green   40 sec                                BOSU Knee Ups   5x 10" hd 5x10" Hold   Left Only   5x 10" hold Left only 5x 10" hd Left Only 5x 10" hd   Left only    Ther Ex            [address] [facility]  10 min L6  10 min L6  10 min L6 10 min L6 10 min L6    Eccentric heel lowering  2x10 5 decline 4" Step" 2x10 5" Decline   4" step  2x10 5" Decline 4" Step 2x10 5" decline 4" Step 2x10 5"   Decline 4" step     Step downs  2x10 6" Step NT       Leg and calf press  2x10 Leg & Calf 70# 2x10 Each   70#   3x10 leg ea  2x10 calf 70# 3x10 Leg ea 2x10 Calf 70# 3x10 leg ea  2x10 calf  70#    Bilateral ankle eversion with TB            BAPS board             Elliptical  5 min L1 8 min L1   8 min L1 NT Hold                                                                      HEP update/review             Ther Activity                                      Gait Training                                      Modalities            CP to the left foot and ankle in seated with LE elevated  15 min  15 min  15 min 15 min 15 min

## 2020-07-24 ENCOUNTER — OFFICE VISIT (OUTPATIENT)
Dept: PHYSICAL THERAPY | Facility: CLINIC | Age: 55
End: 2020-07-24
Payer: COMMERCIAL

## 2020-07-24 DIAGNOSIS — S86.002D INJURY OF LEFT ACHILLES TENDON, SUBSEQUENT ENCOUNTER: Primary | ICD-10-CM

## 2020-07-24 PROCEDURE — 97112 NEUROMUSCULAR REEDUCATION: CPT

## 2020-07-24 PROCEDURE — 97110 THERAPEUTIC EXERCISES: CPT

## 2020-07-24 PROCEDURE — 97140 MANUAL THERAPY 1/> REGIONS: CPT

## 2020-07-24 NOTE — PROGRESS NOTES
Daily Note     Today's date: 2020  Patient name: Elton Gamboa  : 1965  MRN: [id number]  Referring provider: Ginette Saint, DPM  Dx:   Encounter Diagnosis     ICD-10-CM    1  Injury of left Achilles tendon, subsequent encounter S86 002D                   Subjective: Patient states modification of program was beneficial in preventing back spasm  "I still had some soreness but no spasm"      Objective: See treatment diary below      Assessment: Tolerated treatment fair  Patient demonstrated fatigue post treatment  Continued with modified program to accommodate patient subjective  Patient would benefit from continued therapy to decrease pain and increase strength and flexibility to improve LOF      Plan: Continue per plan of care        Precautions:  MD protocol (On Chart)     Manuals 7/24 7/15 7/17 7/20 7/22 7/24   Left ankle MRE all directions  3 min  3 min  3 min 3 min 3 min 3 min   Left ankle mobs and stretching  12 min w/ STM to calf 12 min  w/ STM to calf 12 min 12 min 12 min 12 min                             Neuro Re-Ed            Dynamic Toe taps on Step     1 min 6" Step 1 min 6" Step Held  Held   Side step and squat       SS agility ladder 5x  SS agility ladder 5x  SS agility ladder 5x SS agility 5x   Tandem and side step walk on foam   NT Dynamic Side step over 6" Step 10x Bilat Dynamic Lat Step over 6" step 10x Bilat Held  [employer]   Fast step  Heels up/down      30"   Each way    Step over   DC DC      BOSU march   DC DC      S/L Dead lifts  2x10 Bilat 5# 2x10 5# Bilat  2x10 10# Bilat 2x10 10# Bilat 2x10 10#  bilat    Cable column lateral walk outs  5x Bilat 48# 5x Bilat   48#   Push Cart 30# 5x Push Cart 30# 5x Pushcart   30# 5x Push cart 5x 30#   BOSU Front and lateral lunge  2x10 Bilat 2x10 Each   Bilat   2x10 ea Bilat 2x10 ea Bilat 2x10 ea  bialt 2x10 ea Bilat   BOSU step over with lateral kick out  2x10 Bilat 2x10 Bilat   10x Bilat no UE use 10x Bilat 1 UE use 10x bilat 1 UE use  10x Bilat 1 UE use   TB Run in Place   Thick Green 1 min Thick Green   1 min   Thick Green 1 min Thick Green 1 min Thick  Green   40 sec   Thick Green 1 min                             BOSU Knee Ups   5x 10" hd 5x10" Hold   Left Only   5x 10" hold Left only 5x 10" hd Left Only 5x 10" hd   Left only 5x 10 hd Left Only   Ther Ex            [address] [facility]  10 min L6  10 min L6  10 min L6 10 min L6 10 min L6 10 min L6   Eccentric heel lowering  2x10 5 decline 4" Step" 2x10 5" Decline   4" step  2x10 5" Decline 4" Step 2x10 5" decline 4" Step 2x10 5"   Decline 4" step  2x10 5" decline 4" Step   Step downs  2x10 6" Step NT       Leg and calf press  2x10 Leg & Calf 70# 2x10 Each   70#   3x10 leg ea  2x10 calf 70# 3x10 Leg ea 2x10 Calf 70# 3x10 leg ea  2x10 calf  70# 3x10 leg ea  2x10 calf  70#   Bilateral ankle eversion with TB            BAPS board             Elliptical  5 min L1 8 min L1   8 min L1 NT Hold  Hold                                                                    HEP update/review             Ther Activity                                      Gait Training                                      Modalities            CP to the left foot and ankle in seated with LE elevated  15 min  15 min  15 min 15 min 15 min

## 2020-07-27 ENCOUNTER — OFFICE VISIT (OUTPATIENT)
Dept: PHYSICAL THERAPY | Facility: CLINIC | Age: 55
End: 2020-07-27
Payer: COMMERCIAL

## 2020-07-27 DIAGNOSIS — S86.002D INJURY OF LEFT ACHILLES TENDON, SUBSEQUENT ENCOUNTER: Primary | ICD-10-CM

## 2020-07-27 PROCEDURE — 97112 NEUROMUSCULAR REEDUCATION: CPT

## 2020-07-27 PROCEDURE — 97140 MANUAL THERAPY 1/> REGIONS: CPT

## 2020-07-27 PROCEDURE — 97110 THERAPEUTIC EXERCISES: CPT

## 2020-07-27 NOTE — PROGRESS NOTES
Daily Note     Today's date: 2020  Patient name: Rolanda Sow  : 1965  MRN: [id number]  Referring provider: Freddy Gonzalez DPM  Dx:   Encounter Diagnosis     ICD-10-CM    1  Injury of left Achilles tendon, subsequent encounter S86 002D                   Subjective: Patient had follow up with physician who was happy with progress  Objective: See treatment diary below      Assessment: Tolerated treatment well  Patient demonstrated fatigue post treatment  Continued with modified program  Will continue to monitor pain levels and level of endurance and adjust program as needed in upcoming visits  Patient would benefit from continued therapy to decrease pain and increase strength and flexibility to improve LOF      Plan: Continue per plan of care        Precautions:  MD protocol (On Chart)     Manuals 7/27 7/15 7/17 7/20 7/22 7/24   Left ankle MRE all directions  3 min  3 min  3 min 3 min 3 min 3 min   Left ankle mobs and stretching  12 min w/ STM to calf 12 min  w/ STM to calf 12 min 12 min 12 min 12 min                             Neuro Re-Ed            Dynamic Toe taps on Step  1 min 6"   1 min 6" Step 1 min 6" Step Held  Held   Side step and squat   SS agility 5x     SS agility ladder 5x  SS agility ladder 5x  SS agility ladder 5x SS agility 5x   Tandem and side step walk on foam   NT Dynamic Side step over 6" Step 10x Bilat Dynamic Lat Step over 6" step 10x Bilat Held  [employer]   Fast step  Heels up/down 30" flat/on toes ea     30"   Each way    Step over   DC DC      BOSU march   DC DC      S/L Dead lifts  2x10 15# Bilat 2x10 5# Bilat  2x10 10# Bilat 2x10 10# Bilat 2x10 10#  bilat    Cable column lateral walk outs  Push Cart 5x Bilat 35# 5x Bilat   48#   Push Cart 30# 5x Push Cart 30# 5x Pushcart   30# 5x Push cart 5x 30#   BOSU Front and lateral lunge  2x10 ea Bilat 2x10 Each   Bilat   2x10 ea Bilat 2x10 ea Bilat 2x10 ea  bialt 2x10 ea Bilat   BOSU step over with lateral kick out 10x Bilat 1 no UE use 2x10 Bilat   10x Bilat no UE use 10x Bilat 1 UE use 10x bilat 1 UE use  10x Bilat 1 UE use   TB Run in Place   Thick Green 1 min Thick Green   1 min   Thick Green 1 min Thick Green 1 min Thick  Green   40 sec   Thick Green 1 min                             BOSU Knee Ups   5x 10 hd Left Only 5x10" Hold   Left Only   5x 10" hold Left only 5x 10" hd Left Only 5x 10" hd   Left only 5x 10 hd Left Only   Ther Ex            [address] [facility]  10 min L6  10 min L6  10 min L6 10 min L6 10 min L6 10 min L6   Eccentric heel lowering  2x10 5 decline 4" Step" 2x10 5" Decline   4" step  2x10 5" Decline 4" Step 2x10 5" decline 4" Step 2x10 5"   Decline 4" step  2x10 5" decline 4" Step   Step downs  2x10 6" Step NT       Leg and calf press  2x10 Leg & Calf 90# 2x10 Each   70#   3x10 leg ea  2x10 calf 70# 3x10 Leg ea 2x10 Calf 70# 3x10 leg ea  2x10 calf  70# 3x10 leg ea  2x10 calf  70#   Bilateral ankle eversion with TB            BAPS board             Elliptical  Hold 8 min L1   8 min L1 NT Hold  Hold                                                                    HEP update/review             Ther Activity                                      Gait Training                                      Modalities            CP to the left foot and ankle in seated with LE elevated  15 min  15 min  15 min 15 min 15 min

## 2020-07-29 ENCOUNTER — APPOINTMENT (OUTPATIENT)
Dept: PHYSICAL THERAPY | Facility: CLINIC | Age: 55
End: 2020-07-29
Payer: COMMERCIAL

## 2020-07-30 ENCOUNTER — OFFICE VISIT (OUTPATIENT)
Dept: PHYSICAL THERAPY | Facility: CLINIC | Age: 55
End: 2020-07-30
Payer: COMMERCIAL

## 2020-07-30 DIAGNOSIS — S86.002D INJURY OF LEFT ACHILLES TENDON, SUBSEQUENT ENCOUNTER: Primary | ICD-10-CM

## 2020-07-30 PROCEDURE — 97112 NEUROMUSCULAR REEDUCATION: CPT

## 2020-07-30 PROCEDURE — 97140 MANUAL THERAPY 1/> REGIONS: CPT

## 2020-07-30 PROCEDURE — 97110 THERAPEUTIC EXERCISES: CPT

## 2020-07-30 NOTE — PROGRESS NOTES
Daily Note     Today's date: 2020  Patient name: Navi Sellers  : 1965  MRN: [id number]  Referring provider: Alexa Mayfield DPM  Dx:   Encounter Diagnosis     ICD-10-CM    1  Injury of left Achilles tendon, subsequent encounter S86 002D                   Subjective: Patient states he is trying to concentrate on toe push off during ambulation  Objective: See treatment diary below      Assessment: Tolerated treatment well  Patient demonstrated fatigue post treatment  Gait improving  Patient would benefit from continued therapy to decrease pain and increase strength and flexibility to improve LOF         Plan: Continue per plan of care        Precautions:  MD protocol (On Chart)     Manuals        Left ankle MRE all directions  3 min  3 min        Left ankle mobs and stretching  12 min w/ STM to calf 12 min  w/ STM to calf                               Neuro Re-Ed           Dynamic Toe taps on Step           Side step and squat   SS agility 5x  SS/SS diag 5x ea       Tandem and side step walk on foam          [employer]ocker board   Fast step  Heels up/down 30" flat/on toes ea  30" flat/on toes ea       Step over          BOSU march          S/L Dead lifts  2x10 15# Bilat 2x10 15# bilat       Cable column lateral walk outs  Push Cart 5x Bilat 35# Push Cart 5x Bilat 40#       BOSU Front and lateral lunge  2x10 ea Bilat 2x10 Bilat       BOSU step over with lateral kick out  10x Bilat 1 no UE use 10x Bilat 1 UE use       TB Run in Place   Thick Green 1 min Thick Green 1 min                             BOSU Knee Ups   5x 10 hd Left Only 5x 10 hd L Only       Ther Ex          [facility]  10 min L6  10 min L6       Eccentric heel lowering  2x10 5 decline 4" Step" 2x10 5" hd Decline 4" Step       Step downs  2x10 6" Step 2x10 6" Step       Leg and calf press  2x10 Leg & Calf 90# 2x10 Leg & Calf 90#       Bilateral ankle eversion with TB           BAPS board            Elliptical  Hold                                                                         HEP update/review             Ther Activity                                      Gait Training                                      Modalities            CP to the left foot and ankle in seated with LE elevated  15 min  15 min

## 2020-07-31 ENCOUNTER — OFFICE VISIT (OUTPATIENT)
Dept: PHYSICAL THERAPY | Facility: CLINIC | Age: 55
End: 2020-07-31
Payer: COMMERCIAL

## 2020-07-31 DIAGNOSIS — S86.002D INJURY OF LEFT ACHILLES TENDON, SUBSEQUENT ENCOUNTER: Primary | ICD-10-CM

## 2020-07-31 PROCEDURE — 97140 MANUAL THERAPY 1/> REGIONS: CPT

## 2020-07-31 PROCEDURE — 97112 NEUROMUSCULAR REEDUCATION: CPT

## 2020-07-31 PROCEDURE — 97110 THERAPEUTIC EXERCISES: CPT

## 2020-07-31 NOTE — PROGRESS NOTES
Daily Note     Today's date: 2020  Patient name: Ryan Aguilera  : 1965  MRN: [id number]  Referring provider: Iqra Blackmon DPM  Dx:   Encounter Diagnosis     ICD-10-CM    1  Injury of left Achilles tendon, subsequent encounter S86 002D                   Subjective: Patient states there is some increased tightness present in his tendon due to back to back sessions  Objective: See treatment diary below      Assessment: Tolerated treatment well  Patient exhibited good technique with therapeutic exercises  May be able to progress next visit  Gait is normailizing  Patient is making good progress toward goals  Plan: Continue per plan of care        Precautions:  MD protocol (On Chart)     Manuals       Left ankle MRE all directions  3 min  3 min  3 min      Left ankle mobs and stretching  12 min w/ STM to calf 12 min  w/ STM to calf 12 min w/ STM to calf                              Neuro Re-Ed           Dynamic Toe taps on Step           Side step and squat   SS agility 5x  SS/SS diag 5x ea SS/SS diag 5x ea      Tandem and side step walk on foam          Rocker board   Fast step  Heels up/down 30" flat/on toes ea  30" flat/on toes ea 30" flat/on toes ea      Step over          BOSU march          S/L Dead lifts  2x10 15# Bilat 2x10 15# bilat 2x10 15#      Cable column lateral walk outs  Push Cart 5x Bilat 35# Push Cart 5x Bilat 40# Push cart 5x Bilat 40#      BOSU Front and lateral lunge  2x10 ea Bilat 2x10 Bilat 2x10 Bilat      BOSU step over with lateral kick out  10x Bilat 1 no UE use 10x Bilat 1 UE use 10x Bilat 1 UE use      TB Run in Place   Thick Green 1 min Thick Green 1 min Thick Green 1 min                            BOSU Knee Ups   5x 10 hd Left Only 5x 10 hd L Only 5x 10" hd L only      Ther Ex          SRC  10 min L6  10 min L6 10 min L6      Eccentric heel lowering  2x10 5 decline 4" Step" 2x10 5" hd Decline 4" Step 2x10 5" hdDecline 4" Step      Step downs  2x10 6" Step 2x10 6" Step       Leg and calf press  2x10 Leg & Calf 90# 2x10 Leg & Calf 90# 2x10 Leg & Calf 90#      Bilateral ankle eversion with TB           BAPS board            Elliptical  Hold                                                                         HEP update/review             Ther Activity                                      Gait Training                                      Modalities            CP to the left foot and ankle in seated with LE elevated  15 min  15 min  15 min

## 2020-08-03 ENCOUNTER — OFFICE VISIT (OUTPATIENT)
Dept: PHYSICAL THERAPY | Facility: CLINIC | Age: 55
End: 2020-08-03
Payer: COMMERCIAL

## 2020-08-03 DIAGNOSIS — S86.002D INJURY OF LEFT ACHILLES TENDON, SUBSEQUENT ENCOUNTER: Primary | ICD-10-CM

## 2020-08-03 PROCEDURE — 97112 NEUROMUSCULAR REEDUCATION: CPT

## 2020-08-03 PROCEDURE — 97140 MANUAL THERAPY 1/> REGIONS: CPT

## 2020-08-03 PROCEDURE — 97110 THERAPEUTIC EXERCISES: CPT

## 2020-08-03 NOTE — PROGRESS NOTES
Daily Note     Today's date: 8/3/2020  Patient name: Angela Sorto  : 1965  MRN: [id number]  Referring provider: Marjorie Rivera DPM  Dx:   Encounter Diagnosis     ICD-10-CM    1  Injury of left Achilles tendon, subsequent encounter  S86 002D                   Subjective: "Im doing well  It is still tight after mowing the lawn but otherwise doing well "      Objective: See treatment diary below      Assessment: Tolerated treatment well  Patient demonstrated fatigue post treatment  Patient is challenged by dynamic activities though balance is improving  Plan: Continue per plan of care        Precautions:  MD protocol (On Chart)     Manuals 7/27 7/30 7/31 8/3     Left ankle MRE all directions  3 min  3 min  3 min 3 min      Left ankle mobs and stretching  12 min w/ STM to calf 12 min  w/ STM to calf 12 min w/ STM to calf 12 min w/ STM to calf                             Neuro Re-Ed           Dynamic Toe taps on Step           Side step and squat   SS agility 5x  SS/SS diag 5x ea SS/SS diag 5x ea SS/SS Diag 5x ea     Tandem and side step walk on foam          Rocker board   Fast step  Heels up/down 30" flat/on toes ea  30" flat/on toes ea 30" flat/on toes ea 30" flat/on toes ea     Step over          BOSU march          S/L Dead lifts  2x10 15# Bilat 2x10 15# bilat 2x10 15# 2x10 20#     Cable column lateral walk outs  Push Cart 5x Bilat 35# Push Cart 5x Bilat 40# Push cart 5x Bilat 40# Push Cart 7x 40#     BOSU Front and lateral lunge  2x10 ea Bilat 2x10 Bilat 2x10 Bilat 2x10 Bilat     BOSU step over with lateral kick out  10x Bilat 1 no UE use 10x Bilat 1 UE use 10x Bilat 1 UE use 10x Bilat 1 UE use     TB Run in Place   Thick Green 1 min Thick Green 1 min Thick Green 1 min Thick Green 1 min                           BOSU Knee Ups   5x 10 hd Left Only 5x 10 hd L Only 5x 10" hd L only 5x 10" hd L only     Ther Ex          SRC  10 min L6  10 min L6 10 min L6 10 min L6     Eccentric heel lowering  2x10 5 decline 4" Step" 2x10 5" hd Decline 4" Step 2x10 5" hdDecline 4" Step 2x10 5" hd Decline 4" Step     Step downs  2x10 6" Step 2x10 6" Step       Leg and calf press  2x10 Leg & Calf 90# 2x10 Leg & Calf 90# 2x10 Leg & Calf 90# 2x10 Leg & Calf 95#     Bilateral ankle eversion with TB           BAPS board            Elliptical  Hold                                                                         HEP update/review             Ther Activity                                      Gait Training                                      Modalities            CP to the left foot and ankle in seated with LE elevated  15 min  15 min  15 min 15 min

## 2020-08-05 ENCOUNTER — OFFICE VISIT (OUTPATIENT)
Dept: PHYSICAL THERAPY | Facility: CLINIC | Age: 55
End: 2020-08-05
Payer: COMMERCIAL

## 2020-08-05 DIAGNOSIS — S86.002D INJURY OF LEFT ACHILLES TENDON, SUBSEQUENT ENCOUNTER: Primary | ICD-10-CM

## 2020-08-05 PROCEDURE — 97140 MANUAL THERAPY 1/> REGIONS: CPT

## 2020-08-05 PROCEDURE — 97112 NEUROMUSCULAR REEDUCATION: CPT

## 2020-08-05 PROCEDURE — 97110 THERAPEUTIC EXERCISES: CPT

## 2020-08-05 NOTE — PROGRESS NOTES
Daily Note     Today's date: 2020  Patient name: Sophy Lyons  : 1965  MRN: [id number]  Referring provider: Luis Armando Stone DPM  Dx:   Encounter Diagnosis     ICD-10-CM    1  Injury of left Achilles tendon, subsequent encounter  S86 002D                   Subjective: Patient offers no complaints coming into therapy today  Objective: See treatment diary below      Assessment: Tolerated treatment well  Patient exhibited good technique with therapeutic exercises  Coordination is improving  Plan: Continue per plan of care        Precautions:  MD protocol (On Chart)     Manuals 7/27 7/30 7/31 8/3 8/5    Left ankle MRE all directions  3 min  3 min  3 min 3 min  3 min    Left ankle mobs and stretching  12 min w/ STM to calf 12 min  w/ STM to calf 12 min w/ STM to calf 12 min w/ STM to calf 12 min w/ STM to calf                            Neuro Re-Ed           Dynamic Toe taps on Step           Side step and squat   SS agility 5x  SS/SS diag 5x ea SS/SS diag 5x ea SS/SS Diag 5x ea SS/SS Diag 5x ea    Tandem and side step walk on foam          Rocker board   Fast step  Heels up/down 30" flat/on toes ea  30" flat/on toes ea 30" flat/on toes ea 30" flat/on toes ea 30" flat/on toes ea    Step over          BOSU march          S/L Dead lifts  2x10 15# Bilat 2x10 15# bilat 2x10 15# 2x10 20# 2x10 20#    Cable column lateral walk outs  Push Cart 5x Bilat 35# Push Cart 5x Bilat 40# Push cart 5x Bilat 40# Push Cart 7x 40# Push Cart 7x 40#    BOSU Front and lateral lunge  2x10 ea Bilat 2x10 Bilat 2x10 Bilat 2x10 Bilat 2x10 Bilat    BOSU step over with lateral kick out  10x Bilat 1 no UE use 10x Bilat 1 UE use 10x Bilat 1 UE use 10x Bilat 1 UE use 10x Bilat 1 UE use    TB Run in Place   Thick Green 1 min Thick Green 1 min Thick Green 1 min Thick Green 1 min Thick Green 1 5 min                          BOSU Knee Ups   5x 10 hd Left Only 5x 10 hd L Only 5x 10" hd L only 5x 10" hd L only 5x 10' hd L only Ther Ex          [address] [facility]  10 min L6  10 min L6 10 min L6 10 min L6 10 min L6    Eccentric heel lowering  2x10 5 decline 4" Step" 2x10 5" hd Decline 4" Step 2x10 5" hdDecline 4" Step 2x10 5" hd Decline 4" Step 2x10 5" hd decline 4" Step    Step downs  2x10 6" Step 2x10 6" Step       Leg and calf press  2x10 Leg & Calf 90# 2x10 Leg & Calf 90# 2x10 Leg & Calf 90# 2x10 Leg & Calf 95# 2x10 Leg & Calf 95#    Bilateral ankle eversion with TB           BAPS board            Elliptical  Hold                                                                         HEP update/review             Ther Activity                                      Gait Training                                      Modalities            CP to the left foot and ankle in seated with LE elevated  15 min  15 min  15 min 15 min 15 min

## 2020-08-07 ENCOUNTER — TRANSCRIBE ORDERS (OUTPATIENT)
Dept: PHYSICAL THERAPY | Facility: CLINIC | Age: 55
End: 2020-08-07

## 2020-08-07 ENCOUNTER — EVALUATION (OUTPATIENT)
Dept: PHYSICAL THERAPY | Facility: CLINIC | Age: 55
End: 2020-08-07
Payer: COMMERCIAL

## 2020-08-07 DIAGNOSIS — S86.002D INJURY OF LEFT ACHILLES TENDON, SUBSEQUENT ENCOUNTER: Primary | ICD-10-CM

## 2020-08-07 PROCEDURE — 97140 MANUAL THERAPY 1/> REGIONS: CPT | Performed by: PHYSICAL THERAPIST

## 2020-08-07 PROCEDURE — 97110 THERAPEUTIC EXERCISES: CPT | Performed by: PHYSICAL THERAPIST

## 2020-08-07 PROCEDURE — 97112 NEUROMUSCULAR REEDUCATION: CPT | Performed by: PHYSICAL THERAPIST

## 2020-08-07 NOTE — LETTER
2020    Preston Shah DPM  [address] [facility]    Patient: Maris Rodriguez   YOB: 1965   Date of Visit: 2020     Encounter Diagnosis     ICD-10-CM    1  Injury of left Achilles tendon, subsequent encounter  S86 002D        Dear Dr Priscilla Orozco:    Thank you for your recent referral of Maris Rodriguez  Please review the attached re-evaluation summary from Gerber's recent visit  Please verify that you agree with the plan of care by signing the attached order  If you have any questions or concerns, please do not hesitate to call  I sincerely appreciate the opportunity to share in the care of one of your patients and hope to have another opportunity to work with you in the near future  Sincerely,    Roseline Salinas, PT      Referring Provider:      I certify that I have read the below Plan of Care and certify the need for these services furnished under this plan of treatment while under my care  Preston Shah DPM  [facility] [address]: [phone]          PT Re-Evaluation     Today's date: 2020  Patient name: Maris Rodriguez  : 1965  MRN: [id number]  Referring provider: Karena Santiago DPM  Dx:   Encounter Diagnosis     ICD-10-CM    1  Injury of left Achilles tendon, subsequent encounter  S86 002D                   Assessment  Assessment details: PT notes the patient with improved ROM but continuation of decrease strength t/o the left foot and ankle with demonstration of antalgic gait pattern and balance s/p left Achilles repair leading to increase pain levels and functional limitations to meet therapy goals with need for continuation of skilled therapy for 4 weeks with focus on gait/balance, strengthening, manual therapy, analgesic modalities, and update/review of HEP with progression to HEP    PT notes patient did provide the PT with MD protocol so PT will start patient with phase 3 of protocol and progress as per patient tolerance  Impairments: abnormal gait, abnormal or restricted ROM, activity intolerance, impaired balance, impaired physical strength, lacks appropriate home exercise program and pain with function  Understanding of Dx/Px/POC: good   Prognosis: good    Goals  ST  Initiate HEP-MET  2  Increase strength by 1-2 mm grades-MET  3  Increase ROM by 25-50%-MET   4  Decrease pain by 25-50%-MET   LT  Demonstration of normal gait pattern and balance-Partial MET  2   RTW full duty-Partial MET  3  Decrease limitations with lifting, carrying, squatting, ADL, squatting, and stairs-Partial MET  4  Return to recreational activities-Partial MET  5   DC with HEP-Progressing     Plan  Plan details: PT notes review of POC and findings with patient who is in agreement with PT recommendations of continuation of skilled therapy  Patient would benefit from: PT eval and skilled physical therapy  Planned modality interventions: cryotherapy  Planned therapy interventions: balance, manual therapy, neuromuscular re-education, patient education, self care, strengthening, stretching, therapeutic exercise, home exercise program, graded exercise, gait training, functional ROM exercises and flexibility  Frequency: 2x week  Duration in visits: 8  Duration in weeks: 4  Treatment plan discussed with: patient        Subjective Evaluation    History of Present Illness  Date of onset: 2020  Date of surgery: 2020  Mechanism of injury: surgery and trauma  Mechanism of injury: Patient reports pushing a heavier object/pool into his home when he felt an "pop" in the left heel with development of immediate heel and ankle pain  Patient reports he went to ortho MD for evaluation and found to have + Achilles tear with need for surgical repair    Patient underwent the correction on 20 but after the surgery the patient was affected by the [place] pandemic with no skilled therapy and minimal medical f/u with MD   Patient reports he was transitioned from [facility] to [facility] with [facility] Boot and now WBAT without boot  Patient reports limitations with standing, walking, stair climbing, squatting, balance and lifting  Patient has now been cleared for OPPT by surgeon  Patient reports he is restricted to home duty with his work as a  but states he will require the ability to squat, kneel, lift, climb stairs and prolonged walking with RTW full duty  Patient reports significant PMH of prostrate cancer and broken right foot  Presently the patient has attended 23 sessions of skilled therapy and feels 60-70% improvement since the start of therapy  Patient reports pain levels are down in the calf area  Patient reports his walking is better but continuation of difficulty with balance and weakness in the left leg  Patient feels he needs more therapy to continue to get stronger so he can return full participation in recreation and work duties  Pain  Current pain ratin  At best pain ratin  At worst pain ratin  Location: Left Achilles   Quality: throbbing and sharp  Relieving factors: rest  Aggravating factors: lifting, stair climbing, walking and standing  Progression: improved      Diagnostic Tests  MRI studies: abnormal  Treatments  Previous treatment: immobilization and medication  Current treatment: physical therapy  Patient Goals  Patient goals for therapy: decreased pain, decreased edema, improved balance, increased motion, increased strength, independence with ADLs/IADLs, return to sport/leisure activities and return to work          Objective     Palpation   Left   Tenderness of the lateral gastrocnemius, medial gastrocnemius and soleus  Tenderness   Left Ankle/Foot   Tenderness in the Achilles insertion and plantar fascia  No tenderness in the fifth metatarsal base       Neurological Testing     Reflexes   Left   Patellar (L4): normal (2+)  Achilles (S1): normal (2+)    Right   Patellar (L4): normal (2+)  Achilles (S1): normal (2+)    Active Range of Motion   Left Hip   Normal active range of motion    Right Hip   Normal active range of motion  Left Knee   Normal active range of motion    Right Knee   Normal active range of motion  Left Ankle/Foot   Dorsiflexion (ke): WFL  Plantar flexion: WFL  Inversion: WFL  Eversion: WFL    Right Ankle/Foot   Normal active range of motion    Additional Active Range of Motion Details  PT notes the patient with improved ROM but continuation of decrease strength t/o the left foot and ankle     Passive Range of Motion   Left Ankle/Foot  Normal passive range of motion    Strength/Myotome Testing     Left Hip   Normal muscle strength    Right Hip   Normal muscle strength    Left Knee   Normal strength  Quadriceps contraction: good    Right Knee   Normal strength  Quadriceps contraction: good    Left Ankle/Foot   Dorsiflexion: 5  Plantar flexion: 4+  Inversion: 5  Eversion: 4+    Right Ankle/Foot   Normal strength    Swelling   Left Ankle/Foot   [place]: 28 cm    Right Ankle/Foot   [place]: 27 cm    Ambulation     Observational Gait   Gait: antalgic   Walking speed within functional limits  Decreased stride length and left stance time  Additional Observational Gait Details  PT notes the patient with demonstration of antalgic gait pattern with decrease push-off and decrease tibial advancement on the left with rating of good with static and dynamic activities                Precautions:  MD protocol (On Chart)       Manuals 7/27 7/30 7/31 8/3 8/5 8/7   Left ankle MRE all directions  3 min  3 min  3 min 3 min  3 min 3 min    Left ankle mobs and stretching  12 min w/ STM to calf 12 min  w/ STM to calf 12 min w/ STM to calf 12 min w/ STM to calf 12 min w/ STM to calf 12 with STM to the calf                            Neuro Re-Ed           Dynamic Toe taps on Step           Side step and squat   SS agility 5x  SS/SS diag 5x ea SS/SS diag 5x ea SS/SS Diag 5x ea SS/SS Diag 5x ea SS/SS  Diagonal   5X Each    Tandem and side step walk on foam          Rocker board   Fast step  Heels up/down 30" flat/on toes ea  30" flat/on toes ea 30" flat/on toes ea 30" flat/on toes ea 30" flat/on toes ea 30" Flat/On Toes  Each    Step over          BOSU march          S/L Dead lifts  2x10 15# Bilat 2x10 15# bilat 2x10 15# 2x10 20# 2x10 20# 2x20  20#    Cable column lateral walk outs  Push Cart 5x Bilat 35# Push Cart 5x Bilat 40# Push cart 5x Bilat 40# Push Cart 7x 40# Push Cart 7x 40# Push Cart   50#   5x 25 Feet    BOSU Front and lateral lunge  2x10 ea Bilat 2x10 Bilat 2x10 Bilat 2x10 Bilat 2x10 Bilat 2x10 Bilat    BOSU step over with lateral kick out  10x Bilat 1 no UE use 10x Bilat 1 UE use 10x Bilat 1 UE use 10x Bilat 1 UE use 10x Bilat 1 UE use 10x Bilat  1 UE use    TB Run in Place   Thick Green 1 min Thick Green 1 min Thick Green 1 min Thick Green 1 min Thick Green 1 5 min Thick Green 1 5 Min                          BOSU Knee Ups   5x 10 hd Left Only 5x 10 hd L Only 5x 10" hd L only 5x 10" hd L only 5x 10' hd L only 5x10" Hold   Left Only    Ther Ex          SRC  10 min L6  10 min L6 10 min L6 10 min L6 10 min L6 10 min L6    Eccentric heel lowering  2x10 5 decline 4" Step" 2x10 5" hd Decline 4" Step 2x10 5" hdDecline 4" Step 2x10 5" hd Decline 4" Step 2x10 5" hd decline 4" Step 2x10   5" Decline   4" step    Step downs  2x10 6" Step 2x10 6" Step       Leg and calf press  2x10 Leg & Calf 90# 2x10 Leg & Calf 90# 2x10 Leg & Calf 90# 2x10 Leg & Calf 95# 2x10 Leg & Calf 95# 2x10   Each   100#    Bilateral ankle eversion with TB           BAPS board            Elliptical  Hold                                                                         HEP update/review             Ther Activity                                      Gait Training                                      Modalities            CP to the left foot and ankle in seated with LE elevated  15 min  15 min 15 min 15 min 15 min 15 min

## 2020-08-07 NOTE — PROGRESS NOTES
PT Re-Evaluation     Today's date: 2020  Patient name: Vanetta Mcburney  : 1965  MRN: [id number]  Referring provider: Lakeisha Orlando DPM  Dx:   Encounter Diagnosis     ICD-10-CM    1  Injury of left Achilles tendon, subsequent encounter  S86 002D                   Assessment  Assessment details: PT notes the patient with improved ROM but continuation of decrease strength t/o the left foot and ankle with demonstration of antalgic gait pattern and balance s/p left Achilles repair leading to increase pain levels and functional limitations to meet therapy goals with need for continuation of skilled therapy for 4 weeks with focus on gait/balance, strengthening, manual therapy, analgesic modalities, and update/review of HEP with progression to HEP  PT notes patient did provide the PT with MD protocol so PT will start patient with phase 3 of protocol and progress as per patient tolerance  Impairments: abnormal gait, abnormal or restricted ROM, activity intolerance, impaired balance, impaired physical strength, lacks appropriate home exercise program and pain with function  Understanding of Dx/Px/POC: good   Prognosis: good    Goals  ST  Initiate HEP-MET  2  Increase strength by 1-2 mm grades-MET  3  Increase ROM by 25-50%-MET   4  Decrease pain by 25-50%-MET   LT  Demonstration of normal gait pattern and balance-Partial MET  2   RTW full duty-Partial MET  3  Decrease limitations with lifting, carrying, squatting, ADL, squatting, and stairs-Partial MET  4  Return to recreational activities-Partial MET  5   DC with HEP-Progressing     Plan  Plan details: PT notes review of POC and findings with patient who is in agreement with PT recommendations of continuation of skilled therapy    Patient would benefit from: PT eval and skilled physical therapy  Planned modality interventions: cryotherapy  Planned therapy interventions: balance, manual therapy, neuromuscular re-education, patient education, self care, strengthening, stretching, therapeutic exercise, home exercise program, graded exercise, gait training, functional ROM exercises and flexibility  Frequency: 2x week  Duration in visits: 8  Duration in weeks: 4  Treatment plan discussed with: patient        Subjective Evaluation    History of Present Illness  Date of onset: 2020  Date of surgery: 2020  Mechanism of injury: surgery and trauma  Mechanism of injury: Patient reports pushing a heavier object/pool into his home when he felt an "pop" in the left heel with development of immediate heel and ankle pain  Patient reports he went to ortho MD for evaluation and found to have + Achilles tear with need for surgical repair  Patient underwent the correction on 20 but after the surgery the patient was affected by the [place] pandemic with no skilled therapy and minimal medical f/u with MD   Patient reports he was transitioned from [facility] to [facility] and now [facility] without boot  Patient reports limitations with standing, walking, stair climbing, squatting, balance and lifting  Patient has now been cleared for OPPT by surgeon  Patient reports he is restricted to home duty with his work as a  but states he will require the ability to squat, kneel, lift, climb stairs and prolonged walking with RTW full duty  Patient reports significant PMH of prostrate cancer and broken right foot  Presently the patient has attended 23 sessions of skilled therapy and feels 60-70% improvement since the start of therapy  Patient reports pain levels are down in the calf area  Patient reports his walking is better but continuation of difficulty with balance and weakness in the left leg  Patient feels he needs more therapy to continue to get stronger so he can return full participation in recreation and work duties      Pain  Current pain ratin  At best pain ratin  At worst pain ratin  Location: Left Achilles   Quality: throbbing and sharp  Relieving factors: rest  Aggravating factors: lifting, stair climbing, walking and standing  Progression: improved      Diagnostic Tests  MRI studies: abnormal  Treatments  Previous treatment: immobilization and medication  Current treatment: physical therapy  Patient Goals  Patient goals for therapy: decreased pain, decreased edema, improved balance, increased motion, increased strength, independence with ADLs/IADLs, return to sport/leisure activities and return to work          Objective     Palpation   Left   Tenderness of the lateral gastrocnemius, medial gastrocnemius and soleus  Tenderness   Left Ankle/Foot   Tenderness in the Achilles insertion and plantar fascia  No tenderness in the fifth metatarsal base       Neurological Testing     Reflexes   Left   Patellar (L4): normal (2+)  Achilles (S1): normal (2+)    Right   Patellar (L4): normal (2+)  Achilles (S1): normal (2+)    Active Range of Motion   Left Hip   Normal active range of motion    Right Hip   Normal active range of motion  Left Knee   Normal active range of motion    Right Knee   Normal active range of motion  Left Ankle/Foot   Dorsiflexion (ke): WFL  Plantar flexion: WFL  Inversion: WFL  Eversion: WFL    Right Ankle/Foot   Normal active range of motion    Additional Active Range of Motion Details  PT notes the patient with improved ROM but continuation of decrease strength t/o the left foot and ankle     Passive Range of Motion   Left Ankle/Foot  Normal passive range of motion    Strength/Myotome Testing     Left Hip   Normal muscle strength    Right Hip   Normal muscle strength    Left Knee   Normal strength  Quadriceps contraction: good    Right Knee   Normal strength  Quadriceps contraction: good    Left Ankle/Foot   Dorsiflexion: 5  Plantar flexion: 4+  Inversion: 5  Eversion: 4+    Right Ankle/Foot   Normal strength    Swelling   Left Ankle/Foot   Malleoli: 28 cm    Right Ankle/Foot   Malleoli: 27 cm    Ambulation Observational Gait   Gait: antalgic   Walking speed within functional limits  Decreased stride length and left stance time  Additional Observational Gait Details  PT notes the patient with demonstration of antalgic gait pattern with decrease push-off and decrease tibial advancement on the left with rating of good with static and dynamic activities                Precautions:  MD protocol (On Chart)       Manuals 7/27 7/30 7/31 8/3 8/5 8/7   Left ankle MRE all directions  3 min  3 min  3 min 3 min  3 min 3 min    Left ankle mobs and stretching  12 min w/ STM to calf 12 min  w/ STM to calf 12 min w/ STM to calf 12 min w/ STM to calf 12 min w/ STM to calf 12 with STM to the calf                            Neuro Re-Ed           Dynamic Toe taps on Step           Side step and squat   SS agility 5x  SS/SS diag 5x ea SS/SS diag 5x ea SS/SS Diag 5x ea SS/SS Diag 5x ea SS/SS  Diagonal   5X Each    Tandem and side step walk on foam          Rocker board   Fast step  Heels up/down 30" flat/on toes ea  30" flat/on toes ea 30" flat/on toes ea 30" flat/on toes ea 30" flat/on toes ea 30" Flat/On Toes  Each    Step over          BOSU march          S/L Dead lifts  2x10 15# Bilat 2x10 15# bilat 2x10 15# 2x10 20# 2x10 20# 2x20  20#    Cable column lateral walk outs  Push Cart 5x Bilat 35# Push Cart 5x Bilat 40# Push cart 5x Bilat 40# Push Cart 7x 40# Push Cart 7x 40# Push Cart   50#   5x 25 Feet    BOSU Front and lateral lunge  2x10 ea Bilat 2x10 Bilat 2x10 Bilat 2x10 Bilat 2x10 Bilat 2x10 Bilat    BOSU step over with lateral kick out  10x Bilat 1 no UE use 10x Bilat 1 UE use 10x Bilat 1 UE use 10x Bilat 1 UE use 10x Bilat 1 UE use 10x Bilat  1 UE use    TB Run in Place   Thick Green 1 min Thick Green 1 min Thick Green 1 min Thick Green 1 min Thick Green 1 5 min Thick Green 1 5 Min                          BOSU Knee Ups   5x 10 hd Left Only 5x 10 hd L Only 5x 10" hd L only 5x 10" hd L only 5x 10' hd L only 5x10" Hold   Left Only    Ther Ex          [facility]  10 min L6  10 min L6 10 min L6 10 min L6 10 min L6 10 min L6    Eccentric heel lowering  2x10 5 decline 4" Step" 2x10 5" hd Decline 4" Step 2x10 5" hdDecline 4" Step 2x10 5" hd Decline 4" Step 2x10 5" hd decline 4" Step 2x10   5" Decline   4" step    Step downs  2x10 6" Step 2x10 6" Step       Leg and calf press  2x10 Leg & Calf 90# 2x10 Leg & Calf 90# 2x10 Leg & Calf 90# 2x10 Leg & Calf 95# 2x10 Leg & Calf 95# 2x10   Each   100#    Bilateral ankle eversion with TB           BAPS board            Elliptical  Hold                                                                         HEP update/review             Ther Activity                                      Gait Training                                      Modalities            CP to the left foot and ankle in seated with LE elevated  15 min  15 min  15 min 15 min 15 min 15 min

## 2020-08-10 ENCOUNTER — OFFICE VISIT (OUTPATIENT)
Dept: PHYSICAL THERAPY | Facility: CLINIC | Age: 55
End: 2020-08-10
Payer: COMMERCIAL

## 2020-08-10 DIAGNOSIS — S86.002D INJURY OF LEFT ACHILLES TENDON, SUBSEQUENT ENCOUNTER: Primary | ICD-10-CM

## 2020-08-10 PROCEDURE — 97112 NEUROMUSCULAR REEDUCATION: CPT

## 2020-08-10 PROCEDURE — 97110 THERAPEUTIC EXERCISES: CPT

## 2020-08-10 PROCEDURE — 97140 MANUAL THERAPY 1/> REGIONS: CPT

## 2020-08-10 NOTE — PROGRESS NOTES
Daily Note     Today's date: 8/10/2020  Patient name: Elton Gamboa  : 1965  MRN: [id number]  Referring provider: Ginette Saint, DPM  Dx:   Encounter Diagnosis     ICD-10-CM    1  Injury of left Achilles tendon, subsequent encounter  S86 002D                   Subjective: Patient was able to run (due to the rain) "I have not run in 5 years!"      Objective: See treatment diary below       Assessment: Tolerated treatment well  Patient demonstrated fatigue post treatment  Patient is making good progress toward goals and will move towards a home program       Plan: Continue per plan of care        Precautions:  MD protocol (On Chart)       Manuals 8/10    8/5 8/7   Left ankle MRE all directions  3 min     3 min 3 min    Left ankle mobs and stretching  12 min w/ STM to calf    12 min w/ STM to calf 12 with STM to the calf                          Neuro Re-Ed          Dynamic Toe taps on Step          Side step and squat   SS agility 5x     SS/SS Diag 5x ea SS/SS  Diagonal   5X Each    Tandem and side step walk on foam          Rocker board   Fast step  Heels up/down 1 min flat/on toes ea    30" flat/on toes ea 30" Flat/On Toes  Each    Step over          BOSU march          S/L Dead lifts  2x10 20# Bilat    2x10 20# 2x20  20#    Cable column lateral walk outs  Push Cart 5x Bilat 50#    Push Cart 7x 40# Push Cart   50#   5x 25 Feet    BOSU Front and lateral lunge  2x10 ea Bilat    2x10 Bilat 2x10 Bilat    BOSU step over with lateral kick out  10x Bilat 1 no UE use    10x Bilat 1 UE use 10x Bilat  1 UE use    TB Run in Place   Thick Green 1 5 min    Thick Green 1 5 min Thick Green 1 5 Min                          BOSU Knee Ups   5x 10 hd Left Only    5x 10' hd L only 5x10" Hold   Left Only    Ther Ex          [address] [facility]  10 min L6     10 min L6 10 min L6    Eccentric heel lowering  2x10 5 decline 4" Step"    2x10 5" hd decline 4" Step 2x10   5" Decline   4" step    Step downs          Leg and calf press  2x10 Leg & Calf 100#    2x10 Leg & Calf 95# 2x10   Each   100#    Bilateral ankle eversion with TB           BAPS board            Elliptical  Hold                                                                         HEP update/review             Ther Activity                                      Gait Training                                      Modalities            CP to the left foot and ankle in seated with LE elevated  15 min     15 min 15 min

## 2020-08-12 ENCOUNTER — OFFICE VISIT (OUTPATIENT)
Dept: PHYSICAL THERAPY | Facility: CLINIC | Age: 55
End: 2020-08-12
Payer: COMMERCIAL

## 2020-08-12 DIAGNOSIS — S86.002D INJURY OF LEFT ACHILLES TENDON, SUBSEQUENT ENCOUNTER: Primary | ICD-10-CM

## 2020-08-12 PROCEDURE — 97140 MANUAL THERAPY 1/> REGIONS: CPT

## 2020-08-12 PROCEDURE — 97112 NEUROMUSCULAR REEDUCATION: CPT

## 2020-08-12 PROCEDURE — 97110 THERAPEUTIC EXERCISES: CPT

## 2020-08-12 NOTE — PROGRESS NOTES
Daily Note     Today's date: 2020  Patient name: Kiirll Moreau  : 1965  MRN: [id number]  Referring provider: Humberto Uribe DPM  Dx:   Encounter Diagnosis     ICD-10-CM    1  Injury of left Achilles tendon, subsequent encounter  S86 002D                 Subjective: Patient reports some soreness occasionally with increased activity but overall is doing well  Objective: See treatment diary below       Assessment: Tolerated treatment well  Patient demonstrated fatigue post treatment  PT notes the patient with improved ROM but continuation of decrease strength t/o the left foot and ankle with demonstration of antalgic gait pattern and balance s/p left Achilles repair leading to increase pain levels and functional limitations       Plan: Continue per plan of care        Precautions:  MD protocol (On Chart)       Manuals 8/10 8/12   8/5 8/7   Left ankle MRE all directions  3 min  3 min   3 min 3 min    Left ankle mobs and stretching  12 min w/ STM to calf 12 min w/ STM to calf   12 min w/ STM to calf 12 with STM to the calf                          Neuro Re-Ed          Dynamic Toe taps on Step          Side step and squat   SS agility 5x  SS/SS Diagonal 5x ea    SS/SS Diag 5x ea SS/SS  Diagonal   5X Each    Tandem and side step walk on foam          Rocker board   Fast step  Heels up/down 1 min flat/on toes ea 1 min flat/on toes ea   30" flat/on toes ea 30" Flat/On Toes  Each    Step over          BOSU march          S/L Dead lifts  2x10 20# Bilat 2x10 20# Bilat   2x10 20# 2x20  20#    Cable column lateral walk outs  Push Cart 5x Bilat 50# Push Cart 5x Bilat 50#   Push Cart 7x 40# Push Cart   50#   5x 25 Feet    BOSU Front and lateral lunge  2x10 ea Bilat 2x10 Bilat   2x10 Bilat 2x10 Bilat    BOSU step over with lateral kick out  10x Bilat 1 no UE use 10x Bilat No UE use   10x Bilat 1 UE use 10x Bilat  1 UE use    TB Run in Place   Thick Green 1 5 min Thick Green 1 5 min   Thick Green 1 5 min Thick Green 1 5 Min                          BOSU Knee Ups   5x 10 hd Left Only 5x 10  hd Left only   5x 10' hd L only 5x10" Hold   Left Only    Ther Ex          [address] [facility]  10 min L6  10 min L6   10 min L6 10 min L6    Eccentric heel lowering  2x10 5 decline 4" Step" 2x10 5 decline 4" Step   2x10 5" hd decline 4" Step 2x10   5" Decline   4" step    Step downs          Leg and calf press  2x10 Leg & Calf 100# 2x10 Leg & Calf 100#   2x10 Leg & Calf 95# 2x10   Each   100#    Bilateral ankle eversion with TB           BAPS board            Elliptical  Hold                                                                         HEP update/review             Ther Activity                                      Gait Training                                      Modalities            CP to the left foot and ankle in seated with LE elevated  15 min  15 min   15 min 15 min

## 2020-08-14 ENCOUNTER — OFFICE VISIT (OUTPATIENT)
Dept: PHYSICAL THERAPY | Facility: CLINIC | Age: 55
End: 2020-08-14
Payer: COMMERCIAL

## 2020-08-14 DIAGNOSIS — S86.002D INJURY OF LEFT ACHILLES TENDON, SUBSEQUENT ENCOUNTER: Primary | ICD-10-CM

## 2020-08-14 PROCEDURE — 97140 MANUAL THERAPY 1/> REGIONS: CPT

## 2020-08-14 PROCEDURE — 97110 THERAPEUTIC EXERCISES: CPT

## 2020-08-14 PROCEDURE — 97112 NEUROMUSCULAR REEDUCATION: CPT

## 2020-08-14 NOTE — PROGRESS NOTES
Daily Note     Today's date: 2020  Patient name: Angela Sorto  : 1965  MRN: [id number]  Referring provider: Marjorie Rivera DPM  Dx:   Encounter Diagnosis     ICD-10-CM    1  Injury of left Achilles tendon, subsequent encounter  S86 002D                   Subjective: "For the first time yesterday, It felt normal when I got up  Then we walked around the grocery store and it was tight again "      Objective: See treatment diary below       Assessment: Tolerated treatment well  Patient demonstrated fatigue post treatment  Coordination and speed improving  Verbal cuing required to push off toes  Left calf still hypotrophic but definition is beginning to occur  Plan: Continue per plan of care        Precautions:  MD protocol (On Chart)       Manuals 8/10 8/12 8/14  8/5 8/7   Left ankle MRE all directions  3 min  3 min 3 min  3 min 3 min    Left ankle mobs and stretching  12 min w/ STM to calf 12 min w/ STM to calf 12 min w/ STM calf  12 min w/ STM to calf 12 with STM to the calf                          Neuro Re-Ed          Dynamic Toe taps on Step          Side step and squat   SS agility 5x  SS/SS Diagonal 5x ea  SS/SS Diag 5x ea  SS/SS Diag 5x ea SS/SS  Diagonal   5X Each    Tandem and side step walk on foam          Rocker board   Fast step  Heels up/down 1 min flat/on toes ea 1 min flat/on toes ea 1 min flat/on toes  30" flat/on toes ea 30" Flat/On Toes  Each    Step over          BOSU march          S/L Dead lifts  2x10 20# Bilat 2x10 20# Bilat 2x10 20#  2x10 20# 2x20  20#    Cable column lateral walk outs  Push Cart 5x Bilat 50# Push Cart 5x Bilat 50# Push Cart Bilat 7x 50#  Push Cart 7x 40# Push Cart   50#   5x 25 Feet    BOSU Front and lateral lunge  2x10 ea Bilat 2x10 Bilat 2x10 Bilat  2x10 Bilat 2x10 Bilat    BOSU step over with lateral kick out  10x Bilat 1 no UE use 10x Bilat No UE use 10x Bilat No UE use  10x Bilat 1 UE use 10x Bilat  1 UE use    TB Run in Place   Thick Green 1 5 min Thick Green 1 5 min Thick Green 1 5 min  Thick Green 1 5 min Thick Green 1 5 Min                          BOSU Knee Ups   5x 10 hd Left Only 5x 10  hd Left only 5x 10" hd L only  5x 10' hd L only 5x10" Hold   Left Only    Ther Ex          [facility]  10 min L6  10 min L6 10 min L6  10 min L6 10 min L6    Eccentric heel lowering  2x10 5 decline 4" Step" 2x10 5 decline 4" Step 2x10 5" Decline 4" Step  2x10 5" hd decline 4" Step 2x10   5" Decline   4" step    Step downs          Leg and calf press  2x10 Leg & Calf 100# 2x10 Leg & Calf 100# 100# 2x10  2x10 Leg & Calf 95# 2x10   Each   100#    Bilateral ankle eversion with TB           BAPS board            Elliptical  Hold                                                                         HEP update/review             Ther Activity                                      Gait Training                                      Modalities            CP to the left foot and ankle in seated with LE elevated  15 min  15 min 15 min  15 min 15 min

## 2020-08-17 ENCOUNTER — OFFICE VISIT (OUTPATIENT)
Dept: PHYSICAL THERAPY | Facility: CLINIC | Age: 55
End: 2020-08-17
Payer: COMMERCIAL

## 2020-08-17 DIAGNOSIS — S86.002D INJURY OF LEFT ACHILLES TENDON, SUBSEQUENT ENCOUNTER: Primary | ICD-10-CM

## 2020-08-17 PROCEDURE — 97140 MANUAL THERAPY 1/> REGIONS: CPT

## 2020-08-17 PROCEDURE — 97112 NEUROMUSCULAR REEDUCATION: CPT

## 2020-08-17 PROCEDURE — 97110 THERAPEUTIC EXERCISES: CPT

## 2020-08-17 NOTE — PROGRESS NOTES
Daily Note     Today's date: 2020  Patient name: Maris Rodriguez  : 1965  MRN: [id number]  Referring provider: Karena Santiago DPM  Dx:   Encounter Diagnosis     ICD-10-CM    1  Injury of left Achilles tendon, subsequent encounter  S86 002D                   Subjective: "The  always angers it, but it resolves with rest "      Objective: See treatment diary below      Assessment: Tolerated treatment well  Patient exhibited good technique with therapeutic exercises  Coordination and endurance improved  Patient is able to get up on toes during dynamic activities  Plan: Continue per plan of care        Precautions:  MD protocol (On Chart)       Manuals 8/10 8/12 8/14 8/17     Left ankle MRE all directions  3 min  3 min 3 min 3 min     Left ankle mobs and stretching  12 min w/ STM to calf 12 min w/ STM to calf 12 min w/ STM calf 12 min w/ STM to calf                           Neuro Re-Ed          Dynamic Toe taps on Step          Side step and squat   SS agility 5x  SS/SS Diagonal 5x ea  SS/SS Diag 5x ea SS/SS Diag 5x ea     Tandem and side step walk on foam          Rocker board   Fast step  Heels up/down 1 min flat/on toes ea 1 min flat/on toes ea 1 min flat/on toes 1 min flat/toes     Step over          BOS          S/L Dead lifts  2x10 20# Bilat 2x10 20# Bilat 2x10 20# 2x10 20#     Cable column lateral walk outs  Push Cart 5x Bilat 50# Push Cart 5x Bilat 50# Push Cart Bilat 7x 50# Push Cart 7x 50#     BOSU Front and lateral lunge  2x10 ea Bilat 2x10 Bilat 2x10 Bilat 2x10 Bilat     BOSU step over with lateral kick out  10x Bilat 1 no UE use 10x Bilat No UE use 10x Bilat No UE use 10x Bilat No UE use     TB Run in Place   Thick Green 1 5 min Thick Green 1 5 min Thick Green 1 5 min Thick Green 1 5 min                           BOSU Knee Ups   5x 10 hd Left Only 5x 10  hd Left only 5x 10" hd L only 5x 10" hd     Ther Ex          [facility]  10 min L6  10 min L6 10 min L6 10 min L6 Eccentric heel lowering  2x10 5 decline 4" Step" 2x10 5 decline 4" Step 2x10 5" Decline 4" Step 2x10 5"hd     Step downs          Leg and calf press  2x10 Leg & Calf 100# 2x10 Leg & Calf 100# 100# 2x10 100# 2x10     Bilateral ankle eversion with TB           BAPS board            Elliptical  Hold                                                                         HEP update/review             Ther Activity                                      Gait Training                                      Modalities            CP to the left foot and ankle in seated with LE elevated  15 min  15 min 15 min 15 min

## 2020-08-19 ENCOUNTER — OFFICE VISIT (OUTPATIENT)
Dept: PHYSICAL THERAPY | Facility: CLINIC | Age: 55
End: 2020-08-19
Payer: COMMERCIAL

## 2020-08-19 DIAGNOSIS — S86.002D INJURY OF LEFT ACHILLES TENDON, SUBSEQUENT ENCOUNTER: Primary | ICD-10-CM

## 2020-08-19 PROCEDURE — 97110 THERAPEUTIC EXERCISES: CPT

## 2020-08-19 PROCEDURE — 97112 NEUROMUSCULAR REEDUCATION: CPT

## 2020-08-19 PROCEDURE — 97140 MANUAL THERAPY 1/> REGIONS: CPT

## 2020-08-19 NOTE — PROGRESS NOTES
Daily Note     Today's date: 2020  Patient name: Liat Marquez  : 1965  MRN: [id number]  Referring provider: Tori Sow DPM  Dx:   Encounter Diagnosis     ICD-10-CM    1  Injury of left Achilles tendon, subsequent encounter  S86 002D                   Subjective: Patient reports continuing tightness but no pain  Objective: See treatment diary below      Assessment: Tolerated treatment well  Patient exhibited good technique with therapeutic exercises      Plan: Continue per plan of care        Precautions:  MD protocol (On Chart)       Manuals 8/10 8/12 8/14 8/17 8/19    Left ankle MRE all directions  3 min  3 min 3 min 3 min 3 min    Left ankle mobs and stretching  12 min w/ STM to calf 12 min w/ STM to calf 12 min w/ STM calf 12 min w/ STM to calf 12 min w/ STM to calf                          Neuro Re-Ed          Dynamic Toe taps on Step          Side step and squat   SS agility 5x  SS/SS Diagonal 5x ea  SS/SS Diag 5x ea SS/SS Diag 5x ea SS/SS Diag 5x ea    Tandem and side step walk on foam          Rocker board   Fast step  Heels up/down 1 min flat/on toes ea 1 min flat/on toes ea 1 min flat/on toes 1 min flat/toes 1 min ea flat/toes    Step over          BOSU march          S/L Dead lifts  2x10 20# Bilat 2x10 20# Bilat 2x10 20# 2x10 20# 2x10 20#    Cable column lateral walk outs  Push Cart 5x Bilat 50# Push Cart 5x Bilat 50# Push Cart Bilat 7x 50# Push Cart 7x 50# Push Cart #50    BOSU Front and lateral lunge  2x10 ea Bilat 2x10 Bilat 2x10 Bilat 2x10 Bilat 2x10 Bilat    BOSU step over with lateral kick out  10x Bilat 1 no UE use 10x Bilat No UE use 10x Bilat No UE use 10x Bilat No UE use 10x Bilat No UE use    TB Run in Place   Thick Green 1 5 min Thick Green 1 5 min Thick Green 1 5 min Thick Green 1 5 min Thick Green 1 5 min                          BOSU Knee Ups   5x 10 hd Left Only 5x 10  hd Left only 5x 10" hd L only 5x 10" hd 5x 10" hd    Ther Ex          [facility]  10 min L6  10 min L6 10 min L6 10 min L6 10 min L6    Eccentric heel lowering  2x10 5 decline 4" Step" 2x10 5 decline 4" Step 2x10 5" Decline 4" Step 2x10 5"hd 2x10 5" hd Decline 4" Step    Step downs          Leg and calf press  2x10 Leg & Calf 100# 2x10 Leg & Calf 100# 100# 2x10 100# 2x10 100# 2x10    Bilateral ankle eversion with TB           BAPS board            Elliptical  Hold                                                                         HEP update/review             Ther Activity                                      Gait Training                                      Modalities            CP to the left foot and ankle in seated with LE elevated  15 min  15 min 15 min 15 min 15 min

## 2020-08-21 ENCOUNTER — OFFICE VISIT (OUTPATIENT)
Dept: PHYSICAL THERAPY | Facility: CLINIC | Age: 55
End: 2020-08-21
Payer: COMMERCIAL

## 2020-08-21 DIAGNOSIS — S86.002D INJURY OF LEFT ACHILLES TENDON, SUBSEQUENT ENCOUNTER: Primary | ICD-10-CM

## 2020-08-21 PROCEDURE — 97140 MANUAL THERAPY 1/> REGIONS: CPT

## 2020-08-21 PROCEDURE — 97112 NEUROMUSCULAR REEDUCATION: CPT

## 2020-08-21 PROCEDURE — 97110 THERAPEUTIC EXERCISES: CPT

## 2020-08-21 NOTE — PROGRESS NOTES
Daily Note     Today's date: 2020  Patient name: Mickey Shaw  : 1965  MRN: [id number]  Referring provider: Florecita Maza DPM  Dx:   Encounter Diagnosis     ICD-10-CM    1  Injury of left Achilles tendon, subsequent encounter  S86 002D                   Subjective: Patient states he notices less tightness present  Some morning stiffness is present  Objective: See treatment diary below      Assessment: Tolerated treatment well  Patient exhibited good technique with therapeutic exercises  Balance & coordination improving  Plan: Conintue plan of care       Precautions:  MD protocol (On Chart)       Manuals 8/10 8/12 8/14 8/17 8/19 8/21   Left ankle MRE all directions  3 min  3 min 3 min 3 min 3 min 3 min   Left ankle mobs and stretching  12 min w/ STM to calf 12 min w/ STM to calf 12 min w/ STM calf 12 min w/ STM to calf 12 min w/ STM to calf 12 min w/ STM to calf                         Neuro Re-Ed          Dynamic Toe taps on Step          Side step and squat   SS agility 5x  SS/SS Diagonal 5x ea  SS/SS Diag 5x ea SS/SS Diag 5x ea SS/SS Diag 5x ea SS/SS Diag 5x ea   Tandem and side step walk on foam          Rocker board   Fast step  Heels up/down 1 min flat/on toes ea 1 min flat/on toes ea 1 min flat/on toes 1 min flat/toes 1 min ea flat/toes 1 min ea flat/toes   Step over          BOSU march          S/L Dead lifts  2x10 20# Bilat 2x10 20# Bilat 2x10 20# 2x10 20# 2x10 20# 2x10 20#   Cable column lateral walk outs  Push Cart 5x Bilat 50# Push Cart 5x Bilat 50# Push Cart Bilat 7x 50# Push Cart 7x 50# Push Cart #50 Push Cart 50# fast   BOSU Front and lateral lunge  2x10 ea Bilat 2x10 Bilat 2x10 Bilat 2x10 Bilat 2x10 Bilat 2x10 Bilat   BOSU step over with lateral kick out  10x Bilat 1 no UE use 10x Bilat No UE use 10x Bilat No UE use 10x Bilat No UE use 10x Bilat No UE use 10x Bilat no UE use   TB Run in Place   Thick Green 1 5 min Thick Green 1 5 min Thick Green 1 5 min Thick Green 1 5 min Thick Green 1 5 min Thick Green 1 5 min    S/L Around the workd       5x ea 10# Bilat              BOSU Knee Ups   5x 10 hd Left Only 5x 10  hd Left only 5x 10" hd L only 5x 10" hd 5x 10" hd 5x 10 " hd   Ther Ex          [address] [facility]  10 min L6  10 min L6 10 min L6 10 min L6 10 min L6 10 min L6   Eccentric heel lowering  2x10 5 decline 4" Step" 2x10 5 decline 4" Step 2x10 5" Decline 4" Step 2x10 5"hd 2x10 5" hd Decline 4" Step 2x10 5" hd decline 4" Step   Step downs          Leg and calf press  2x10 Leg & Calf 100# 2x10 Leg & Calf 100# 100# 2x10 100# 2x10 100# 2x10 105# 2x10   Bilateral ankle eversion with TB           BAPS board            Elliptical  Hold                                                                         HEP update/review             Ther Activity                                      Gait Training                                      Modalities            CP to the left foot and ankle in seated with LE elevated  15 min  15 min 15 min 15 min 15 min 15 min

## 2020-08-24 ENCOUNTER — OFFICE VISIT (OUTPATIENT)
Dept: PHYSICAL THERAPY | Facility: CLINIC | Age: 55
End: 2020-08-24
Payer: COMMERCIAL

## 2020-08-24 DIAGNOSIS — S86.002D INJURY OF LEFT ACHILLES TENDON, SUBSEQUENT ENCOUNTER: Primary | ICD-10-CM

## 2020-08-24 PROCEDURE — 97140 MANUAL THERAPY 1/> REGIONS: CPT

## 2020-08-24 PROCEDURE — 97110 THERAPEUTIC EXERCISES: CPT

## 2020-08-24 PROCEDURE — 97112 NEUROMUSCULAR REEDUCATION: CPT

## 2020-08-24 NOTE — PROGRESS NOTES
Daily Note     Today's date: 2020  Patient name: Navi Sellers  : 1965  MRN: [id number]  Referring provider: Alexa Mayfield DPM  Dx:   Encounter Diagnosis     ICD-10-CM    1  Injury of left Achilles tendon, subsequent encounter  S86 002D                   Subjective: Patient states overall he is doing well  Some stiffness is reported after extended activity  Objective: See treatment diary below      Assessment: Tolerated treatment well  Patient exhibited good technique with therapeutic exercises  Patient is challenged with S/L balance though improving slowly  Plan: Continue per plan of care        Precautions:  MD protocol (On Chart)       Manuals    Left ankle MRE all directions  3 min  3 min 3 min 3 min 3 min 3 min   Left ankle mobs and stretching  12 min w/ STM to calf 12 min w/ STM to calf 12 min w/ STM calf 12 min w/ STM to calf 12 min w/ STM to calf 12 min w/ STM to calf                         Neuro Re-Ed          Dynamic Toe taps on Step          Side step and squat   SS agility 5x  SS/SS Diagonal 5x ea  SS/SS Diag 5x ea SS/SS Diag 5x ea SS/SS Diag 5x ea SS/SS Diag 5x ea   Tandem and side step walk on foam          Rocker board   Fast step  Heels up/down 1 min flat/on toes ea 1 min flat/on toes ea 1 min flat/on toes 1 min flat/toes 1 min ea flat/toes 1 min ea flat/toes   Step over          BOS          S/L Dead lifts  2x10 20# Bilat 2x10 20# Bilat 2x10 20# 2x10 20# 2x10 20# 2x10 20#   Cable column lateral walk outs  Push Cart 5x Bilat 50# Push Cart 5x Bilat 50# Push Cart Bilat 7x 50# Push Cart 7x 50# Push Cart #50 Push Cart 50# fast   BOSU Front and lateral lunge  2x10 ea Bilat 2x10 Bilat 2x10 Bilat 2x10 Bilat 2x10 Bilat 2x10 Bilat   BOSU step over with lateral kick out  10x Bilat 1 no UE use 10x Bilat No UE use 10x Bilat No UE use 10x Bilat No UE use 10x Bilat No UE use 10x Bilat no UE use   TB Run in Place   Thick Green 1 5 min Thick Green 1 5 min Thick Green 1 5 min Thick Green 1 5 min Thick Green 1 5 min Thick Green 1 5 min    S/L Around [place]       5x ea 10# Bilat              BOSU Knee Ups   5x 10 hd Left Only 5x 10  hd Left only 5x 10" hd L only 5x 10" hd 5x 10" hd 5x 10 " hd   Ther Ex          [facility]  10 min L6  10 min L6 10 min L6 10 min L6 10 min L6 10 min L6   Eccentric heel lowering  2x10 5 decline 4" Step" 2x10 5 decline 4" Step 2x10 5" Decline 4" Step 2x10 5"hd 2x10 5" hd Decline 4" Step 2x10 5" hd decline 4" Step   Step downs          Leg and calf press  2x10 Leg & Calf 100# 2x10 Leg & Calf 100# 100# 2x10 100# 2x10 100# 2x10 105# 2x10   Bilateral ankle eversion with TB           BAPS board            Elliptical                                                                           HEP update/review             Ther Activity                                      Gait Training                                      Modalities            CP to the left foot and ankle in seated with LE elevated  15 min  15 min 15 min 15 min 15 min 15 min

## 2020-08-26 ENCOUNTER — APPOINTMENT (OUTPATIENT)
Dept: PHYSICAL THERAPY | Facility: CLINIC | Age: 55
End: 2020-08-26
Payer: COMMERCIAL

## 2020-08-28 ENCOUNTER — OFFICE VISIT (OUTPATIENT)
Dept: PHYSICAL THERAPY | Facility: CLINIC | Age: 55
End: 2020-08-28
Payer: COMMERCIAL

## 2020-08-28 DIAGNOSIS — S86.002D INJURY OF LEFT ACHILLES TENDON, SUBSEQUENT ENCOUNTER: Primary | ICD-10-CM

## 2020-08-28 PROCEDURE — 97140 MANUAL THERAPY 1/> REGIONS: CPT

## 2020-08-28 PROCEDURE — 97112 NEUROMUSCULAR REEDUCATION: CPT

## 2020-08-28 PROCEDURE — 97110 THERAPEUTIC EXERCISES: CPT

## 2020-08-28 NOTE — PROGRESS NOTES
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 in pain or symptom      Objective: See treatment diary below      Assessment: Tolerated treatment well  Patient demonstrated fatigue post treatment appropriate for program  Patient is able to complete program with improved coordination, speed and endurance  Plan: Continue per plan of care        Precautions:  MD protocol (On Chart)       Manuals        Left ankle MRE all directions  3 min  3 min       Left ankle mobs and stretching  12 min w/ STM to calf 12 min w/ STM to calf                             Neuro Re-Ed          Dynamic Toe taps on Step          Side step and squat   SS agility 5x  SS/SS Diagonal 5x ea        Tandem and side step walk on foam          Rocker board   Fast step  Heels up/down 1 min flat/on toes ea 1 min flat/on toes ea       Step over          BOSU march          S/L Dead lifts  2x10 20# Bilat 2x10 20# Bilat       Cable column lateral walk outs  Push Cart 5x Bilat 50# Push Cart 5x Bilat 50#       BOSU Front and lateral lunge  2x10 ea Bilat 2x10 Bilat       BOSU step over with lateral kick out  10x Bilat 1 no UE use 10x Bilat No UE use       TB Run in Place   Thick Green 1 5 min Thick Green 1 5 min        S/L Around the [place]   5x ea cc/ccw                  BOSU Knee Ups   5x 10 hd Left Only 5x 10  hd Left only       Ther Ex          [facility]  10 min L6  10 min L6       Eccentric heel lowering  2x10 5 decline 4" Step" 2x10 5 decline 4" Step       Step downs          Leg and calf press  2x10 Leg & Calf 105# 2x10 Leg & Calf 110#       Bilateral ankle eversion with TB           BAPS board            Elliptical                                                                      HEP update/review            Ther Activity                                   Gait Training                                   Modalities           CP to the left foot and ankle in seated with LE elevated  15 min  15 min

## 2020-08-31 ENCOUNTER — TRANSCRIBE ORDERS (OUTPATIENT)
Dept: PHYSICAL THERAPY | Facility: CLINIC | Age: 55
End: 2020-08-31

## 2020-08-31 ENCOUNTER — EVALUATION (OUTPATIENT)
Dept: PHYSICAL THERAPY | Facility: CLINIC | Age: 55
End: 2020-08-31
Payer: COMMERCIAL

## 2020-08-31 DIAGNOSIS — S86.002D INJURY OF LEFT ACHILLES TENDON, SUBSEQUENT ENCOUNTER: Primary | ICD-10-CM

## 2020-08-31 PROCEDURE — 97112 NEUROMUSCULAR REEDUCATION: CPT

## 2020-08-31 PROCEDURE — 97110 THERAPEUTIC EXERCISES: CPT

## 2020-08-31 PROCEDURE — 97140 MANUAL THERAPY 1/> REGIONS: CPT

## 2020-08-31 PROCEDURE — 97140 MANUAL THERAPY 1/> REGIONS: CPT | Performed by: PHYSICAL THERAPIST

## 2020-08-31 NOTE — PROGRESS NOTES
Daily Note     Today's date: 2020  Patient name: Etta Younger  : 1965  MRN: [id number]  Referring provider: Kameron Gipson DPM  Dx:   Encounter Diagnosis     ICD-10-CM    1  Injury of left Achilles tendon, subsequent encounter  S86 002D                   Subjective: Patient offers no complaints  Objective: See treatment diary below      Assessment: Tolerated treatment well  Patient exhibited good technique with therapeutic exercises  Patient is progress well towards  Patient was re-evaluated by supervising physical therapist, see report for details  Plan: Potential discharge next visit       Precautions:  MD protocol (On Chart)       Manuals       Left ankle MRE all directions  3 min  3 min 3 min      Left ankle mobs and stretching  12 min w/ STM to calf 12 min w/ STM to calf 12 min w/ STM to calf                            Neuro Re-Ed          Dynamic Toe taps on Step          Side step and squat   SS agility 5x  SS/SS Diagonal 5x ea  SS/SS Diag 5x ea      Tandem and side step walk on foam          Rocker board   Fast step  Heels up/down 1 min flat/on toes ea 1 min flat/on toes ea 1 min flat/on toes ea      Step over          BOSU march          S/L Dead lifts  2x10 20# Bilat 2x10 20# Bilat 2x10 20# Bilat      Cable column lateral walk outs  Push Cart 5x Bilat 50# Push Cart 5x Bilat 50# Push Cart 5x Bilat 60#      BOSU Front and lateral lunge  2x10 ea Bilat 2x10 Bilat 2x10 Bilat      BOSU step over with lateral kick out  10x Bilat 1 no UE use 10x Bilat No UE use 10x Bilat No UE use      TB Run in Place   Thick Green 1 5 min Thick Green 1 5 min Thick Green 1 5 min       S/L Around the [place]   5x ea cc/ccw 5x ea cc/ccw                 BOSU Knee Ups   5x 10 hd Left Only 5x 10  hd Left only 5x 10 hd Left Only      Ther Ex          SRC  10 min L6  10 min L6 10 min L6      Eccentric heel lowering  2x10 5 decline 4" Step" 2x10 5 decline 4" Step 2x10 5 decline 4" Step      Step downs          Leg and calf press  2x10 Leg & Calf 105# 2x10 Leg & Calf 110# 2x10 Leg & Calf 110#      Bilateral ankle eversion with TB           BAPS board            Elliptical                                                                      HEP update/review            Ther Activity                                   Gait Training                                   Modalities           CP to the left foot and ankle in seated with LE elevated  15 min  15 min 15 min

## 2020-08-31 NOTE — PROGRESS NOTES
PT Re-Evaluation     Today's date: 2020  Patient name: Wendy Dumont  : 1965  MRN: [id number]  Referring provider: Shirley Romano DPM  Dx:   Encounter Diagnosis     ICD-10-CM    1  Injury of left Achilles tendon, subsequent encounter  S86 002D                   Assessment  Assessment details: PT notes the patient with improved ROM and strength t/o the left foot and ankle with demonstration of improved gait pattern and balance s/p left Achilles repair so PT will continue with skilled therapy for 1 session to update/review HEP and then DC with HEP  Impairments: abnormal gait, abnormal or restricted ROM, activity intolerance, impaired balance, impaired physical strength, lacks appropriate home exercise program and pain with function  Understanding of Dx/Px/POC: good   Prognosis: good    Goals  ST  Initiate HEP-MET  2  Increase strength by 1-2 mm grades-MET  3  Increase ROM by 25-50%-MET   4  Decrease pain by 25-50%-MET   LT  Demonstration of normal gait pattern and balance-MET  2   RTW full duty-Partial MET  3  Decrease limitations with lifting, carrying, squatting, ADL, squatting, and stairs-MET  4  Return to recreational activities-MET  5   DC with HEP-Progressing     Plan  Plan details: PT notes review of POC and findings with patient who is in agreement with PT recommendations of continuation of skilled therapy for 1 session to update/review HEP and then DC with HEP     Patient would benefit from: PT eval and skilled physical therapy  Planned modality interventions: cryotherapy  Planned therapy interventions: balance, manual therapy, neuromuscular re-education, patient education, self care, strengthening, stretching, therapeutic exercise, home exercise program, graded exercise, gait training, functional ROM exercises and flexibility  Frequency: 2x week  Duration in visits: 1  Duration in weeks: 1  Treatment plan discussed with: patient        Subjective Evaluation    History of Present Illness  Date of onset: 2020  Date of surgery: 2020  Mechanism of injury: surgery and trauma  Mechanism of injury: Patient reports pushing a heavier object/pool into his home when he felt an "pop" in the left heel with development of immediate heel and ankle pain  Patient reports he went to ortho MD for evaluation and found to have + Achilles tear with need for surgical repair  Patient underwent the correction on 20 but after the surgery the patient was affected by the [place] pandemic with no skilled therapy and minimal medical f/u with MD   [facility] and now WBAT without boot  Patient reports limitations with standing, walking, stair climbing, squatting, balance and lifting  Patient has now been cleared for OPPT by surgeon  Patient reports he is restricted to home duty with his work as a  but states he will require the ability to squat, kneel, lift, climb stairs and prolonged walking with RTW full duty  Patient reports significant PMH of prostrate cancer and broken right foot  Presently the patient has attended 31 sessions of skilled therapy and feels 80-90% improvement since the start of therapy  Patient reports pain levels are down in the calf area  Patient reports his walking and balance are improved  Patient feels he has returned to normal activities with minimal limitations  Patient reports he is happy with current status and feels he is ready for a DC with HEP     Pain  Current pain ratin  At best pain ratin  At worst pain ratin  Location: Left Achilles   Quality: throbbing and sharp  Relieving factors: rest  Aggravating factors: lifting, stair climbing, walking and standing  Progression: improved      Diagnostic Tests  MRI studies: abnormal  Treatments  Previous treatment: immobilization and medication  Current treatment: physical therapy  Patient Goals  Patient goals for therapy: decreased pain, decreased edema, improved balance, increased motion, increased strength, independence with ADLs/IADLs, return to sport/leisure activities and return to work          Objective     Tenderness   Left Ankle/Foot   No tenderness in the Achilles insertion, fifth metatarsal base and plantar fascia  Neurological Testing     Reflexes   Left   Patellar (L4): normal (2+)  Achilles (S1): normal (2+)    Right   Patellar (L4): normal (2+)  Achilles (S1): normal (2+)    Active Range of Motion   Left Hip   Normal active range of motion    Right Hip   Normal active range of motion  Left Knee   Normal active range of motion    Right Knee   Normal active range of motion  Left Ankle/Foot   Dorsiflexion (ke): WFL  Plantar flexion: WFL  Inversion: WFL  Eversion: WFL    Right Ankle/Foot   Normal active range of motion    Additional Active Range of Motion Details  PT notes the patient with improved ROM and strength t/o the left foot and ankle     Passive Range of Motion   Left Ankle/Foot  Normal passive range of motion    Strength/Myotome Testing     Left Hip   Normal muscle strength    Right Hip   Normal muscle strength    Left Knee   Normal strength  Quadriceps contraction: good    Right Knee   Normal strength  Quadriceps contraction: good    Left Ankle/Foot   Dorsiflexion: 5  Plantar flexion: 5  Inversion: 5  Eversion: 5    Right Ankle/Foot   Normal strength    Swelling   Left Ankle/Foot   Malleoli: 27 5 cm    Right Ankle/Foot   Malleoli: 27 cm    Ambulation     Observational Gait   Gait: within functional limits   Walking speed, stride length and left stance time within functional limits  Additional Observational Gait Details  PT notes the patient with demonstration of normal gait pattern and balance with rating of good with static and dynamic activities                Precautions:  MD protocol (On Chart)

## 2020-09-02 ENCOUNTER — APPOINTMENT (OUTPATIENT)
Dept: PHYSICAL THERAPY | Facility: CLINIC | Age: 55
End: 2020-09-02
Payer: COMMERCIAL

## 2020-09-04 ENCOUNTER — OFFICE VISIT (OUTPATIENT)
Dept: PHYSICAL THERAPY | Facility: CLINIC | Age: 55
End: 2020-09-04
Payer: COMMERCIAL

## 2020-09-04 ENCOUNTER — TRANSCRIBE ORDERS (OUTPATIENT)
Dept: PHYSICAL THERAPY | Facility: CLINIC | Age: 55
End: 2020-09-04

## 2020-09-04 DIAGNOSIS — S86.002D INJURY OF LEFT ACHILLES TENDON, SUBSEQUENT ENCOUNTER: Primary | ICD-10-CM

## 2020-09-04 PROCEDURE — 97140 MANUAL THERAPY 1/> REGIONS: CPT | Performed by: PHYSICAL THERAPIST

## 2020-09-04 PROCEDURE — 97535 SELF CARE MNGMENT TRAINING: CPT | Performed by: PHYSICAL THERAPIST

## 2020-09-04 NOTE — PROGRESS NOTES
PT Discharge    Today's date: 2020  Patient name: Nannette Cates  : 1965  MRN: [id number]  Referring provider: Funmi Garsia DPM  Dx:   Encounter Diagnosis     ICD-10-CM    1  Injury of left Achilles tendon, subsequent encounter  S86 002D                   Assessment  Assessment details: PT notes the patient with improved ROM and strength t/o the left foot and ankle with demonstration of improved gait pattern and balance s/p left Achilles repair so PT with decision to DC with HEP  Impairments: abnormal gait, abnormal or restricted ROM, activity intolerance, impaired balance, impaired physical strength, lacks appropriate home exercise program and pain with function  Understanding of Dx/Px/POC: good   Prognosis: good    Goals  ST  Initiate HEP-MET  2  Increase strength by 1-2 mm grades-MET  3  Increase ROM by 25-50%-MET   4  Decrease pain by 25-50%-MET   LT  Demonstration of normal gait pattern and balance-MET  2   RTW full duty-Partial MET  3  Decrease limitations with lifting, carrying, squatting, ADL, squatting, and stairs-MET  4  Return to recreational activities-MET  5   DC with HEP-MET    Plan  Plan details: DC with HEP     Patient would benefit from: PT eval and skilled physical therapy  Planned modality interventions: cryotherapy  Planned therapy interventions: balance, manual therapy, neuromuscular re-education, patient education, self care, strengthening, stretching, therapeutic exercise, home exercise program, graded exercise, gait training, functional ROM exercises and flexibility  Frequency: 2x week  Duration in visits: 1  Duration in weeks: 1  Treatment plan discussed with: patient        Subjective Evaluation    History of Present Illness  Date of onset: 2020  Date of surgery: 2020  Mechanism of injury: surgery and trauma  Mechanism of injury: Patient reports pushing a heavier object/pool into his home when he felt an "pop" in the left heel with development of immediate heel and ankle pain  Patient reports he went to ortho MD for evaluation and found to have + Achilles tear with need for surgical repair  Patient underwent the correction on 20 but after the surgery the patient was affected by the [place] pandemic with no skilled therapy and minimal medical f/u with MD   Patient reports he was transitioned from [facility] to [facility] with CAM Boot and now WBAT without boot  Patient reports limitations with standing, walking, stair climbing, squatting, balance and lifting  Patient has now been cleared for OPPT by surgeon  Patient reports he is restricted to home duty with his work as a  but states he will require the ability to squat, kneel, lift, climb stairs and prolonged walking with RTW full duty  Patient reports significant PMH of prostrate cancer and broken right foot  Presently the patient has attended 32 sessions of skilled therapy and feels 80-90% improvement since the start of therapy  Patient reports pain levels are down in the calf area  Patient reports his walking and balance are improved  Patient feels he has returned to normal activities with minimal limitations  Patient reports he is happy with current status and feels he is ready for a DC with HEP     Pain  Current pain ratin  At best pain ratin  At worst pain ratin  Location: Left Achilles   Quality: throbbing and sharp  Relieving factors: rest  Aggravating factors: lifting, stair climbing, walking and standing  Progression: improved      Diagnostic Tests  MRI studies: abnormal  Treatments  Previous treatment: immobilization and medication  Current treatment: physical therapy  Patient Goals  Patient goals for therapy: decreased pain, decreased edema, improved balance, increased motion, increased strength, independence with ADLs/IADLs, return to sport/leisure activities and return to work          Objective     Tenderness   Left Ankle/Foot   No tenderness in the Achilles insertion, fifth metatarsal base and plantar fascia  Neurological Testing     Reflexes   Left   Patellar (L4): normal (2+)  Achilles (S1): normal (2+)    Right   Patellar (L4): normal (2+)  Achilles (S1): normal (2+)    Active Range of Motion   Left Hip   Normal active range of motion    Right Hip   Normal active range of motion  Left Knee   Normal active range of motion    Right Knee   Normal active range of motion  Left Ankle/Foot   Dorsiflexion (ke): WFL  Plantar flexion: WFL  Inversion: WFL  Eversion: WFL    Right Ankle/Foot   Normal active range of motion    Additional Active Range of Motion Details  PT notes the patient with improved ROM and strength t/o the left foot and ankle     Passive Range of Motion   Left Ankle/Foot  Normal passive range of motion    Strength/Myotome Testing     Left Hip   Normal muscle strength    Right Hip   Normal muscle strength    Left Knee   Normal strength  Quadriceps contraction: good    Right Knee   Normal strength  Quadriceps contraction: good    Left Ankle/Foot   Dorsiflexion: 5  Plantar flexion: 5  Inversion: 5  Eversion: 5    Right Ankle/Foot   Normal strength    Swelling   Left Ankle/Foot   Malleoli: 27 5 cm    Right Ankle/Foot   Malleoli: 27 cm    Ambulation     Observational Gait   Gait: within functional limits   Walking speed, stride length and left stance time within functional limits  Additional Observational Gait Details  PT notes the patient with demonstration of normal gait pattern and balance with rating of good with static and dynamic activities                Precautions:  MD protocol (On Chart)     Manuals 8/24 8/28 8/31 9/4     Left ankle MRE all directions  3 min  3 min 3 min 5 min      Left ankle mobs and stretching  12 min w/ STM to calf 12 min w/ STM to calf 12 min w/ STM to calf 10 min with STM to the calf                           Neuro Re-Ed          Dynamic Toe taps on Step          Side step and squat   SS agility 5x  SS/SS Diagonal 5x ea SS/SS Diag 5x ea      Tandem and side step walk on foam          Rocker board   Fast step  Heels up/down 1 min flat/on toes ea 1 min flat/on toes ea 1 min flat/on toes ea      Step over          BOSU march          S/L Dead lifts  2x10 20# Bilat 2x10 20# Bilat 2x10 20# Bilat      Cable column lateral walk outs  Push Cart 5x Bilat 50# Push Cart 5x Bilat 50# Push Cart 5x Bilat 60#      BOSU Front and lateral lunge  2x10 ea Bilat 2x10 Bilat 2x10 Bilat      BOSU step over with lateral kick out  10x Bilat 1 no UE use 10x Bilat No UE use 10x Bilat No UE use      TB Run in Place   Thick Green 1 5 min Thick Green 1 5 min Thick Green 1 5 min       S/L Around the world   5x ea cc/ccw 5x ea cc/ccw                 BOSU Knee Ups   5x 10 hd Left Only 5x 10  hd Left only 5x 10 hd Left Only      Ther Ex          SRC  10 min L6  10 min L6 10 min L6 10 min L8      Eccentric heel lowering  2x10 5 decline 4" Step" 2x10 5 decline 4" Step 2x10 5 decline 4" Step      Step downs          Leg and calf press  2x10 Leg & Calf 105# 2x10 Leg & Calf 110# 2x10 Leg & Calf 110#      Bilateral ankle eversion with TB           BAPS board            Elliptical                                                                      HEP update/review       15 min     Ther Activity                                   Gait Training                                   Modalities           CP to the left foot and ankle in seated with LE elevated  15 min  15 min 15 min 15 min

## 2020-09-04 NOTE — LETTER
2020    Denny Silva DPM  [address] [facility]    Patient: Getachew Gibbs   YOB: 1965   Date of Visit: 2020     Encounter Diagnosis     ICD-10-CM    1  Injury of left Achilles tendon, subsequent encounter  S86 002D        Dear Dr Sanford Mayo:    Thank you for your recent referral of Getachew Gibbs  Please review the attached discharge summary from Gerber's recent visit  Please verify that you agree with the plan of care by signing the attached order  If you have any questions or concerns, please do not hesitate to call  I sincerely appreciate the opportunity to share in the care of one of your patients and hope to have another opportunity to work with you in the near future  Sincerely,    Sejal Wiseman, PT      Referring Provider:      I certify that I have read the below Plan of Care and certify the need for these services furnished under this plan of treatment while under my care  ALBINO Sarmiento 1690: [phone]          PT Discharge    Today's date: 2020  Patient name: Getachew Gibbs  : 1965  MRN: [id number]  Referring provider: Goran Barton DPM  Dx:   Encounter Diagnosis     ICD-10-CM    1  Injury of left Achilles tendon, subsequent encounter  S86 002D                   Assessment  Assessment details: PT notes the patient with improved ROM and strength t/o the left foot and ankle with demonstration of improved gait pattern and balance s/p left Achilles repair so PT with decision to DC with HEP  Impairments: abnormal gait, abnormal or restricted ROM, activity intolerance, impaired balance, impaired physical strength, lacks appropriate home exercise program and pain with function  Understanding of Dx/Px/POC: good   Prognosis: good    Goals  ST  Initiate HEP-MET  2  Increase strength by 1-2 mm grades-MET  3    Increase ROM by 25-50%-MET   4  Decrease pain by 25-50%-MET   LT  Demonstration of normal gait pattern and balance-MET  2   RTW full duty-Partial MET  3  Decrease limitations with lifting, carrying, squatting, ADL, squatting, and stairs-MET  4  Return to recreational activities-MET  5   DC with HEP-MET    Plan  Plan details: DC with HEP  Patient would benefit from: PT eval and skilled physical therapy  Planned modality interventions: cryotherapy  Planned therapy interventions: balance, manual therapy, neuromuscular re-education, patient education, self care, strengthening, stretching, therapeutic exercise, home exercise program, graded exercise, gait training, functional ROM exercises and flexibility  Frequency: 2x week  Duration in visits: 1  Duration in weeks: 1  Treatment plan discussed with: patient        Subjective Evaluation    History of Present Illness  Date of onset: 2020  Date of surgery: 2020  Mechanism of injury: surgery and trauma  Mechanism of injury: Patient reports pushing a heavier object/pool into his home when he felt an "pop" in the left heel with development of immediate heel and ankle pain  Patient reports he went to ortho MD for evaluation and found to have + Achilles tear with need for surgical repair  Patient underwent the correction on 20 but after the surgery the patient was affected by the [place] pandemic with no skilled therapy and minimal medical f/u with MD   Patient reports he was transitioned from [facility] to [facility] with CAM Boot and now WBAT without boot  Patient reports limitations with standing, walking, stair climbing, squatting, balance and lifting  Patient has now been cleared for OPPT by surgeon  Patient reports he is restricted to home duty with his work as a  but states he will require the ability to squat, kneel, lift, climb stairs and prolonged walking with RTW full duty  Patient reports significant PMH of prostrate cancer and broken right foot  Presently the patient has attended 32 sessions of skilled therapy and feels 80-90% improvement since the start of therapy  Patient reports pain levels are down in the calf area  Patient reports his walking and balance are improved  Patient feels he has returned to normal activities with minimal limitations  Patient reports he is happy with current status and feels he is ready for a DC with HEP  Pain  Current pain ratin  At best pain ratin  At worst pain ratin  Location: Left Achilles   Quality: throbbing and sharp  Relieving factors: rest  Aggravating factors: lifting, stair climbing, walking and standing  Progression: improved      Diagnostic Tests  MRI studies: abnormal  Treatments  Previous treatment: immobilization and medication  Current treatment: physical therapy  Patient Goals  Patient goals for therapy: decreased pain, decreased edema, improved balance, increased motion, increased strength, independence with ADLs/IADLs, return to sport/leisure activities and return to work          Objective     Tenderness   Left Ankle/Foot   No tenderness in the Achilles insertion, fifth metatarsal base and plantar fascia       Neurological Testing     Reflexes   Left   Patellar (L4): normal (2+)  Achilles (S1): normal (2+)    Right   Patellar (L4): normal (2+)  Achilles (S1): normal (2+)    Active Range of Motion   Left Hip   Normal active range of motion    Right Hip   Normal active range of motion  Left Knee   Normal active range of motion    Right Knee   Normal active range of motion  Left Ankle/Foot   Dorsiflexion (ke): WFL  Plantar flexion: WFL  Inversion: WFL  Eversion: WFL    Right Ankle/Foot   Normal active range of motion    Additional Active Range of Motion Details  PT notes the patient with improved ROM and strength t/o the left foot and ankle     Passive Range of Motion   Left Ankle/Foot  Normal passive range of motion    Strength/Myotome Testing     Left Hip   Normal muscle strength    Right Hip   Normal muscle strength    Left Knee   Normal strength  Quadriceps contraction: good    Right Knee   Normal strength  Quadriceps contraction: good    Left Ankle/Foot   Dorsiflexion: 5  Plantar flexion: 5  Inversion: 5  Eversion: 5    Right Ankle/Foot   Normal strength    Swelling   Left Ankle/Foot   Malleoli: 27 5 cm    Right Ankle/Foot   Malleoli: 27 cm    Ambulation     Observational Gait   Gait: within functional limits   Walking speed, stride length and left stance time within functional limits  Additional Observational Gait Details  PT notes the patient with demonstration of normal gait pattern and balance with rating of good with static and dynamic activities                Precautions:  MD protocol (On Chart)     Manuals 8/24 8/28 8/31 9/4     Left ankle MRE all directions  3 min  3 min 3 min 5 min      Left ankle mobs and stretching  12 min w/ STM to calf 12 min w/ STM to calf 12 min w/ STM to calf 10 min with STM to the calf                           Neuro Re-Ed          Dynamic Toe taps on Step          Side step and squat   SS agility 5x  SS/SS Diagonal 5x ea  SS/SS Diag 5x ea      Tandem and side step walk on foam          Rocker board   Fast step  Heels up/down 1 min flat/on toes ea 1 min flat/on toes ea 1 min flat/on toes ea      Step over          BOSU march          S/L Dead lifts  2x10 20# Bilat 2x10 20# Bilat 2x10 20# Bilat      Cable column lateral walk outs  Push Cart 5x Bilat 50# Push Cart 5x Bilat 50# Push Cart 5x Bilat 60#      BOSU Front and lateral lunge  2x10 ea Bilat 2x10 Bilat 2x10 Bilat      BOSU step over with lateral kick out  10x Bilat 1 no UE use 10x Bilat No UE use 10x Bilat No UE use      TB Run in Place   Thick Green 1 5 min Thick Green 1 5 min Thick Green 1 5 min       S/L Around the world   5x ea cc/ccw 5x ea cc/ccw                 BOSU Knee Ups   5x 10 hd Left Only 5x 10  hd Left only 5x 10 hd Left Only      Ther Ex          SRC  10 min L6  10 min L6 10 min L6 10 min L8      Eccentric heel lowering  2x10 5 decline 4" Step" 2x10 5 decline 4" Step 2x10 5 decline 4" Step      Step downs          Leg and calf press  2x10 Leg & Calf 105# 2x10 Leg & Calf 110# 2x10 Leg & Calf 110#      Bilateral ankle eversion with TB           BAPS board            Elliptical                                                                      HEP update/review       15 min     Ther Activity                                   Gait Training                                   Modalities           CP to the left foot and ankle in seated with LE elevated  15 min  15 min 15 min 15 min

## 2020-09-08 ENCOUNTER — APPOINTMENT (OUTPATIENT)
Dept: PHYSICAL THERAPY | Facility: CLINIC | Age: 55
End: 2020-09-08
Payer: COMMERCIAL

## 2020-09-09 ENCOUNTER — APPOINTMENT (OUTPATIENT)
Dept: PHYSICAL THERAPY | Facility: CLINIC | Age: 55
End: 2020-09-09
Payer: COMMERCIAL

## 2020-09-11 ENCOUNTER — APPOINTMENT (OUTPATIENT)
Dept: PHYSICAL THERAPY | Facility: CLINIC | Age: 55
End: 2020-09-11
Payer: COMMERCIAL

## 2020-09-14 ENCOUNTER — APPOINTMENT (OUTPATIENT)
Dept: PHYSICAL THERAPY | Facility: CLINIC | Age: 55
End: 2020-09-14
Payer: COMMERCIAL

## 2020-09-18 ENCOUNTER — APPOINTMENT (OUTPATIENT)
Dept: PHYSICAL THERAPY | Facility: CLINIC | Age: 55
End: 2020-09-18
Payer: COMMERCIAL

## 2020-09-21 ENCOUNTER — TELEPHONE (OUTPATIENT)
Dept: UROLOGY | Facility: MEDICAL CENTER | Age: 55
End: 2020-09-21

## 2020-09-21 ENCOUNTER — APPOINTMENT (OUTPATIENT)
Dept: PHYSICAL THERAPY | Facility: CLINIC | Age: 55
End: 2020-09-21
Payer: COMMERCIAL

## 2020-09-21 NOTE — TELEPHONE ENCOUNTER
Patient managed by Dr Poonam Castorena  Patient called in to advise he will be traveling to [facility] a week before his appointment in October and he may not have his psa work done in time  Patient wants to know if he needs to be rescheduled   Patient can be reached at [phone]

## 2020-09-25 ENCOUNTER — APPOINTMENT (OUTPATIENT)
Dept: PHYSICAL THERAPY | Facility: CLINIC | Age: 55
End: 2020-09-25
Payer: COMMERCIAL

## 2020-09-28 ENCOUNTER — APPOINTMENT (OUTPATIENT)
Dept: PHYSICAL THERAPY | Facility: CLINIC | Age: 55
End: 2020-09-28
Payer: COMMERCIAL

## 2020-10-05 ENCOUNTER — TELEPHONE (OUTPATIENT)
Dept: UROLOGY | Facility: MEDICAL CENTER | Age: 55
End: 2020-10-05

## 2020-11-04 ENCOUNTER — OFFICE VISIT (OUTPATIENT)
Dept: UROLOGY | Facility: MEDICAL CENTER | Age: 55
End: 2020-11-04
Payer: COMMERCIAL

## 2020-11-04 VITALS
HEART RATE: 75 BPM | HEIGHT: 72 IN | WEIGHT: 216 LBS | DIASTOLIC BLOOD PRESSURE: 80 MMHG | SYSTOLIC BLOOD PRESSURE: 140 MMHG | TEMPERATURE: 97.5 F | BODY MASS INDEX: 29.26 KG/M2

## 2020-11-04 DIAGNOSIS — Z12.5 SPECIAL SCREENING FOR MALIGNANT NEOPLASM OF PROSTATE: ICD-10-CM

## 2020-11-04 DIAGNOSIS — Z85.46 HISTORY OF PROSTATE CANCER: Primary | ICD-10-CM

## 2020-11-04 DIAGNOSIS — N52.31 ERECTILE DYSFUNCTION AFTER RADICAL PROSTATECTOMY: ICD-10-CM

## 2020-11-04 LAB
SL AMB  POCT GLUCOSE, UA: NORMAL
SL AMB LEUKOCYTE ESTERASE,UA: NORMAL
SL AMB POCT BILIRUBIN,UA: NORMAL
SL AMB POCT BLOOD,UA: NORMAL
SL AMB POCT CLARITY,UA: CLEAR
SL AMB POCT COLOR,UA: YELLOW
SL AMB POCT KETONES,UA: NORMAL
SL AMB POCT NITRITE,UA: NORMAL
SL AMB POCT PH,UA: 7
SL AMB POCT SPECIFIC GRAVITY,UA: 1.01
SL AMB POCT URINE PROTEIN: NORMAL
SL AMB POCT UROBILINOGEN: 0.2

## 2020-11-04 PROCEDURE — 81003 URINALYSIS AUTO W/O SCOPE: CPT | Performed by: UROLOGY

## 2020-11-04 PROCEDURE — 99214 OFFICE O/P EST MOD 30 MIN: CPT | Performed by: UROLOGY

## 2020-11-04 RX ORDER — SILDENAFIL CITRATE 20 MG/1
100 TABLET ORAL AS NEEDED
Qty: 90 TABLET | Refills: 1 | Status: SHIPPED | OUTPATIENT
Start: 2020-11-04 | End: 2020-11-04

## 2020-11-04 RX ORDER — SILDENAFIL CITRATE 20 MG/1
TABLET ORAL
Qty: 90 TABLET | Refills: 0 | Status: SHIPPED | OUTPATIENT
Start: 2020-11-04

## 2021-07-01 NOTE — LETTER
2020    Dana Li, [address]    Patient: Jill Mcclendon   YOB: 1965   Date of Visit: 2020     Encounter Diagnosis     ICD-10-CM    1  Injury of left Achilles tendon, subsequent encounter  S86 002D        Dear Dr Jeanette Yousif:    Thank you for your recent referral of Jill Mcclendon  Please review the attached re-evaluation summary from Gerber's recent visit  Please verify that you agree with the plan of care by signing the attached order  If you have any questions or concerns, please do not hesitate to call  I sincerely appreciate the opportunity to share in the care of one of your patients and hope to have another opportunity to work with you in the near future  Sincerely,    Ayah Preciado, PT      Referring Provider:      I certify that I have read the below Plan of Care and certify the need for these services furnished under this plan of treatment while under my care  Danaean HollandALBINO rivera 1690: [phone]          PT Re-Evaluation     Today's date: 2020  Patient name: Jill Mcclendon  : 1965  MRN: [id number]  Referring provider: Renu Khan DPM  Dx:   Encounter Diagnosis     ICD-10-CM    1  Injury of left Achilles tendon, subsequent encounter  S86 002D                   Assessment  Assessment details: PT notes the patient with improved ROM and strength t/o the left foot and ankle with demonstration of improved gait pattern and balance s/p left Achilles repair so PT will continue with skilled therapy for 1 session to update/review HEP and then DC with HEP  Impairments: abnormal gait, abnormal or restricted ROM, activity intolerance, impaired balance, impaired physical strength, lacks appropriate home exercise program and pain with function  Understanding of Dx/Px/POC: good   Prognosis: good    Goals  ST  Initiate HEP-MET  2  Increase strength by 1-2 mm grades-MET  3  Increase ROM by 25-50%-MET   4  Decrease pain by 25-50%-MET   LT  Demonstration of normal gait pattern and balance-MET  2   RTW full duty-Partial MET  3  Decrease limitations with lifting, carrying, squatting, ADL, squatting, and stairs-MET  4  Return to recreational activities-MET  5   DC with HEP-Progressing     Plan  Plan details: PT notes review of POC and findings with patient who is in agreement with PT recommendations of continuation of skilled therapy for 1 session to update/review HEP and then DC with HEP  Patient would benefit from: PT eval and skilled physical therapy  Planned modality interventions: cryotherapy  Planned therapy interventions: balance, manual therapy, neuromuscular re-education, patient education, self care, strengthening, stretching, therapeutic exercise, home exercise program, graded exercise, gait training, functional ROM exercises and flexibility  Frequency: 2x week  Duration in visits: 1  Duration in weeks: 1  Treatment plan discussed with: patient        Subjective Evaluation    History of Present Illness  Date of onset: 2020  Date of surgery: 2020  Mechanism of injury: surgery and trauma  Mechanism of injury: Patient reports pushing a heavier object/pool into his home when he felt an "pop" in the left heel with development of immediate heel and ankle pain  Patient reports he went to ortho MD for evaluation and found to have + Achilles tear with need for surgical repair  Patient underwent the correction on 20 but after the surgery the patient was affected by the [place] pandemic with no skilled therapy and minimal medical f/u with MD   Patient reports he was transitioned from [facility] to [facility] with CAM Boot and now WBAT without boot  Patient reports limitations with standing, walking, stair climbing, squatting, balance and lifting  Patient has now been cleared for OPPT by surgeon    Patient reports he is restricted to home duty with his work as a  but states he will require the ability to squat, kneel, lift, climb stairs and prolonged walking with RTW full duty  Patient reports significant PMH of prostrate cancer and broken right foot  Presently the patient has attended 31 sessions of skilled therapy and feels 80-90% improvement since the start of therapy  Patient reports pain levels are down in the calf area  Patient reports his walking and balance are improved  Patient feels he has returned to normal activities with minimal limitations  Patient reports he is happy with current status and feels he is ready for a DC with HEP  Pain  Current pain ratin  At best pain ratin  At worst pain ratin  Location: Left Achilles   Quality: throbbing and sharp  Relieving factors: rest  Aggravating factors: lifting, stair climbing, walking and standing  Progression: improved      Diagnostic Tests  MRI studies: abnormal  Treatments  Previous treatment: immobilization and medication  Current treatment: physical therapy  Patient Goals  Patient goals for therapy: decreased pain, decreased edema, improved balance, increased motion, increased strength, independence with ADLs/IADLs, return to sport/leisure activities and return to work          Objective     Tenderness   Left Ankle/Foot   No tenderness in the Achilles insertion, fifth metatarsal base and plantar fascia       Neurological Testing     Reflexes   Left   Patellar (L4): normal (2+)  Achilles (S1): normal (2+)    Right   Patellar (L4): normal (2+)  Achilles (S1): normal (2+)    Active Range of Motion   Left Hip   Normal active range of motion    Right Hip   Normal active range of motion  Left Knee   Normal active range of motion    Right Knee   Normal active range of motion  Left Ankle/Foot   Dorsiflexion (ke): WFL  Plantar flexion: WFL  Inversion: WFL  Eversion: WFL    Right Ankle/Foot   Normal active range of motion    Additional Active Range of Motion Details  PT notes the patient with improved ROM and strength t/o the left foot and ankle     Passive Range of Motion   Left Ankle/Foot  Normal passive range of motion    Strength/Myotome Testing     Left Hip   Normal muscle strength    Right Hip   Normal muscle strength    Left Knee   Normal strength  Quadriceps contraction: good    Right Knee   Normal strength  Quadriceps contraction: good    Left Ankle/Foot   Dorsiflexion: 5  Plantar flexion: 5  Inversion: 5  Eversion: 5    Right Ankle/Foot   Normal strength    Swelling   Left Ankle/Foot   Malleoli: 27 5 cm    Right Ankle/Foot   Malleoli: 27 cm    Ambulation     Observational Gait   Gait: within functional limits   Walking speed, stride length and left stance time within functional limits  Additional Observational Gait Details  PT notes the patient with demonstration of normal gait pattern and balance with rating of good with static and dynamic activities  Precautions:  MD protocol (On Chart)           Daily Note     Today's date: 2020  Patient name: Getachew Gibbs  : 1965  MRN: [id number]  Referring provider: Goran Barton DPM  Dx:   Encounter Diagnosis     ICD-10-CM    1  Injury of left Achilles tendon, subsequent encounter  S86 002D                   Subjective: Patient offers no complaints  Objective: See treatment diary below      Assessment: Tolerated treatment well  Patient exhibited good technique with therapeutic exercises  Patient is progress well towards  Patient was re-evaluated by supervising physical therapist, see report for details  Plan: Potential discharge next visit       Precautions:  MD protocol (On Chart)       Manuals       Left ankle MRE all directions  3 min  3 min 3 min      Left ankle mobs and stretching  12 min w/ STM to calf 12 min w/ STM to calf 12 min w/ STM to calf                            Neuro Re-Ed          Dynamic Toe taps on Step          Side step and squat   SS agility 5x  SS/SS Diagonal 5x ea  SS/SS Diag 5x ea      Tandem and side step walk on foam          Rocker board   Fast step  Heels up/down 1 min flat/on toes ea 1 min flat/on toes ea 1 min flat/on toes ea      Step over          BOSU march          S/L Dead lifts  2x10 20# Bilat 2x10 20# Bilat 2x10 20# Bilat      Cable column lateral walk outs  Push Cart 5x Bilat 50# Push Cart 5x Bilat 50# Push Cart 5x Bilat 60#      BOSU Front and lateral lunge  2x10 ea Bilat 2x10 Bilat 2x10 Bilat      BOSU step over with lateral kick out  10x Bilat 1 no UE use 10x Bilat No UE use 10x Bilat No UE use      TB Run in Place   Thick Green 1 5 min Thick Green 1 5 min Thick Green 1 5 min       S/L Around the world   5x ea cc/ccw 5x ea cc/ccw                 BOSU Knee Ups   5x 10 hd Left Only 5x 10  hd Left only 5x 10 hd Left Only      Ther Ex          SRC  10 min L6  10 min L6 10 min L6      Eccentric heel lowering  2x10 5 decline 4" Step" 2x10 5 decline 4" Step 2x10 5 decline 4" Step      Step downs          Leg and calf press  2x10 Leg & Calf 105# 2x10 Leg & Calf 110# 2x10 Leg & Calf 110#      Bilateral ankle eversion with TB           BAPS board            Elliptical                                                                      HEP update/review            Ther Activity                                   Gait Training                                   Modalities           CP to the left foot and ankle in seated with LE elevated  15 min  15 min 15 min                    rolling walker rolling walker

## 2022-04-11 NOTE — PROGRESS NOTES
PT Evaluation     Today's date: 2022  Patient name: Sweetie Canales  : 1965  MRN: [id number]  Referring provider: Wilman Nixon PT  Dx:   Encounter Diagnosis     ICD-10-CM    1  Low back pain, unspecified back pain laterality, unspecified chronicity, unspecified whether sciatica present  M54 50    2  Intervertebral disc disorder  M51 9                   Assessment  Assessment details: PT notes the patient with decrease ROM and strength t/o the lumbar spine as well as the bilateral hips leading to increase symptoms and functional limitations with need for course of skilled therapy for 2-4 weeks with focus on lumbar stabilization, manual therapy, strengthening, analgesic modalities and transition to HEP  Impairments: abnormal or restricted ROM, activity intolerance, lacks appropriate home exercise program and pain with function  Understanding of Dx/Px/POC: excellent  Goals  ST  Initiate HEP  2  Decrease pain levels by 25-50%   3  Increase strength by 1-2 mm grades  4  Increase ROM by 25-50%   LT  Improve spinal stability with increase trunk/core strength  2  Decrease limitations with standing, walking and stairs  3  Decrease limitations with trunk movement, lifting and carrying  4  Decrease limitations with ADL  5   DC with HEP    Plan  Plan details: Transition to HEP  Patient would benefit from: PT eval and skilled physical therapy  Planned therapy interventions: manual therapy, neuromuscular re-education, patient education, self care, strengthening, stretching, flexibility, graded exercise, home exercise program and therapeutic exercise  Frequency: 2x week  Duration in weeks: 4  Treatment plan discussed with: patient        Subjective Evaluation    History of Present Illness  Mechanism of injury: Patient reports dealing with incidents of back spasms for 20+ years with a recent flare up in symptoms about 1 month ago while rolling in bed    Patient reports he was treated with muscle relaxer and analgesic injections by Urgent Care clinic  Patient reports difficulty with transfers, bed mobility, standing, walking, stairs, trunk movement, carrying, lifting, and ADL  Patient is seeking out assistance from [facility] PT to address chronic LB symptoms  Patient reports he is employed [employer] as  with significant PMH of left Achilles repair and prostate cancer  Pain  Current pain ratin  At best pain ratin  At worst pain ratin  Location: Lumbar spine   Quality: tight, pulling, pressure and cramping  Relieving factors: change in position and rest  Aggravating factors: lifting, stair climbing, walking and standing    Treatments  Previous treatment: medication and injection treatment  Current treatment: medication and physical therapy  Patient Goals  Patient goals for therapy: decreased pain, increased motion, increased strength, independence with ADLs/IADLs and return to sport/leisure activities          Objective     Palpation   Left   Tenderness of the quadratus lumborum  Right   Tenderness of the quadratus lumborum  Tenderness     Lumbar Spine  Tenderness in the spinous process  Left Hip   No tenderness in the PSIS  Right Hip   No tenderness in the PSIS  Neurological Testing     Reflexes   Left   Patellar (L4): normal (2+)  Achilles (S1): normal (2+)    Right   Patellar (L4): normal (2+)  Achilles (S1): normal (2+)    Active Range of Motion     Lumbar   Flexion: 79 degrees   Extension: 11 degrees  with pain  Left lateral flexion:  WFL  Right lateral flexion:  WFL  Left rotation:  WFL  Right rotation:  WFL  Left Hip   Flexion: 39 degrees   Extension: [facility]  External rotation (90/90): 14 degrees   Internal rotation (90/90): WFL    Right Hip   Flexion: 43 degrees   Extension: [facility]  External rotation (90/90): 14 degrees   Internal rotation (90/90):  WFL  Left Knee   Normal active range of motion    Right Knee   Normal active range of motion    Additional Active Range of Motion Details  PT notes the patient with decrease ROM with lack of extension and PA movement of lumbar segments   PT notes decrease ROM and flexibility t/o the bilateral hip     Passive Range of Motion   Left Hip   Flexion: 57 degrees   External rotation (90/90): 17 degrees     Right Hip   Flexion: 49 degrees   External rotation (90/90): 15 degrees     Joint Play     Hypomobile: T12, L1, L2, L3, L4, L5 and S1     Pain: L1, L2 and L3     Strength/Myotome Testing     Left Hip   Normal muscle strength    Right Hip   Normal muscle strength    Left Knee   Normal strength  Quadriceps contraction: good    Right Knee   Normal strength  Quadriceps contraction: good    Tests     Left Hip   Positive piriformis  Negative ALEXA, FADIR and long sit  Giancarlo: Negative  90/90 SLR: Positive  SLR: Positive  Right Hip   Positive piriformis  Negative ALEXA, FADIR and long sit  Giancarlo: Negative  90/90 SLR: Positive  SLR: Positive                Precautions:  PMH Prostate cancer       Manuals 4/13       Bilateral HS, hip ABD, piriformis, and quad with manual hip traction   5 min        Lumbar PA mobs  5 min                        Neuro Re-Ed        Prone OAL         BRANDIE/PPU        Seated Tball hip march and LAQ        Seated Tball Trunk rotation and PNF         TB Palloff punch         TB side step and resist Trunk rotation Arms Out                Ther Ex        Treadmill        Supine Tball LTR                                                 HEP update/review  15 min        Ther Activity                        Gait Training                        Modalities

## 2022-04-13 ENCOUNTER — OFFICE VISIT (OUTPATIENT)
Dept: PHYSICAL THERAPY | Facility: CLINIC | Age: 57
End: 2022-04-13
Payer: COMMERCIAL

## 2022-04-13 DIAGNOSIS — M54.50 LOW BACK PAIN, UNSPECIFIED BACK PAIN LATERALITY, UNSPECIFIED CHRONICITY, UNSPECIFIED WHETHER SCIATICA PRESENT: Primary | ICD-10-CM

## 2022-04-13 DIAGNOSIS — M51.9 INTERVERTEBRAL DISC DISORDER: ICD-10-CM

## 2022-04-13 PROCEDURE — 97140 MANUAL THERAPY 1/> REGIONS: CPT | Performed by: PHYSICAL THERAPIST

## 2022-04-13 PROCEDURE — 97535 SELF CARE MNGMENT TRAINING: CPT | Performed by: PHYSICAL THERAPIST

## 2022-04-13 PROCEDURE — 97162 PT EVAL MOD COMPLEX 30 MIN: CPT | Performed by: PHYSICAL THERAPIST

## 2022-06-02 NOTE — PROGRESS NOTES
PT notes the patient contacted clinic and informed PT that he was feeling good with no further back issues  Patient states he no longer requires OPPT and will continue with HEP  PT will comply with patient request and DC with HEP